# Patient Record
Sex: FEMALE | Race: WHITE | NOT HISPANIC OR LATINO | Employment: UNEMPLOYED | URBAN - METROPOLITAN AREA
[De-identification: names, ages, dates, MRNs, and addresses within clinical notes are randomized per-mention and may not be internally consistent; named-entity substitution may affect disease eponyms.]

---

## 2017-04-26 ENCOUNTER — TRANSCRIBE ORDERS (OUTPATIENT)
Dept: ADMINISTRATIVE | Facility: HOSPITAL | Age: 82
End: 2017-04-26

## 2017-04-26 DIAGNOSIS — Z78.0 MENOPAUSE: Primary | ICD-10-CM

## 2017-04-28 ENCOUNTER — APPOINTMENT (OUTPATIENT)
Dept: GENERAL RADIOLOGY | Facility: HOSPITAL | Age: 82
End: 2017-04-28

## 2017-04-28 ENCOUNTER — HOSPITAL ENCOUNTER (EMERGENCY)
Facility: HOSPITAL | Age: 82
Discharge: HOME | End: 2017-04-28
Attending: EMERGENCY MEDICINE | Admitting: EMERGENCY MEDICINE

## 2017-04-28 VITALS
SYSTOLIC BLOOD PRESSURE: 185 MMHG | RESPIRATION RATE: 18 BRPM | HEART RATE: 90 BPM | BODY MASS INDEX: 25.46 KG/M2 | WEIGHT: 168 LBS | DIASTOLIC BLOOD PRESSURE: 95 MMHG | OXYGEN SATURATION: 98 % | HEIGHT: 68 IN | TEMPERATURE: 98.1 F

## 2017-04-28 DIAGNOSIS — M17.11 ARTHRITIS OF KNEE, RIGHT: ICD-10-CM

## 2017-04-28 DIAGNOSIS — N39.0 URINARY TRACT INFECTION IN ELDERLY PATIENT: Primary | ICD-10-CM

## 2017-04-28 DIAGNOSIS — M16.0 ARTHRITIS OF BOTH HIPS: ICD-10-CM

## 2017-04-28 LAB
ALBUMIN SERPL-MCNC: 3.5 G/DL (ref 3.5–5.2)
ALBUMIN/GLOB SERPL: 1.2 G/DL
ALP SERPL-CCNC: 67 U/L (ref 40–129)
ALT SERPL W P-5'-P-CCNC: 10 U/L (ref 5–33)
ANION GAP SERPL CALCULATED.3IONS-SCNC: 16.4 MMOL/L
AST SERPL-CCNC: 18 U/L (ref 5–32)
BACTERIA UR QL AUTO: ABNORMAL /HPF
BASOPHILS # BLD AUTO: 0.03 10*3/MM3 (ref 0–0.2)
BASOPHILS NFR BLD AUTO: 0.5 % (ref 0–2)
BILIRUB SERPL-MCNC: 1.9 MG/DL (ref 0.2–1.2)
BILIRUB UR QL STRIP: NEGATIVE
BUN BLD-MCNC: 14 MG/DL (ref 8–23)
BUN/CREAT SERPL: 14.1 (ref 7–25)
CALCIUM SPEC-SCNC: 8.7 MG/DL (ref 8.8–10.5)
CHLORIDE SERPL-SCNC: 100 MMOL/L (ref 98–107)
CLARITY UR: CLEAR
CO2 SERPL-SCNC: 22.6 MMOL/L (ref 22–29)
COLOR UR: YELLOW
CREAT BLD-MCNC: 0.99 MG/DL (ref 0.57–1)
DEPRECATED RDW RBC AUTO: 43.3 FL (ref 37–54)
EOSINOPHIL # BLD AUTO: 0.11 10*3/MM3 (ref 0.1–0.3)
EOSINOPHIL NFR BLD AUTO: 1.7 % (ref 0–4)
ERYTHROCYTE [DISTWIDTH] IN BLOOD BY AUTOMATED COUNT: 13.3 % (ref 11.5–14.5)
GFR SERPL CREATININE-BSD FRML MDRD: 53 ML/MIN/1.73
GLOBULIN UR ELPH-MCNC: 3 GM/DL
GLUCOSE BLD-MCNC: 112 MG/DL (ref 65–99)
GLUCOSE UR STRIP-MCNC: NEGATIVE MG/DL
HCT VFR BLD AUTO: 40.9 % (ref 37–47)
HGB BLD-MCNC: 13.8 G/DL (ref 12–16)
HGB UR QL STRIP.AUTO: NEGATIVE
HOLD SPECIMEN: NORMAL
HOLD SPECIMEN: NORMAL
HYALINE CASTS UR QL AUTO: ABNORMAL /LPF
IMM GRANULOCYTES # BLD: 0.03 10*3/MM3 (ref 0–0.03)
IMM GRANULOCYTES NFR BLD: 0.5 % (ref 0–0.5)
KETONES UR QL STRIP: NEGATIVE
LEUKOCYTE ESTERASE UR QL STRIP.AUTO: NEGATIVE
LYMPHOCYTES # BLD AUTO: 1.07 10*3/MM3 (ref 0.6–4.8)
LYMPHOCYTES NFR BLD AUTO: 16.8 % (ref 20–45)
MAGNESIUM SERPL-MCNC: 1.8 MG/DL (ref 1.7–2.5)
MCH RBC QN AUTO: 29.9 PG (ref 27–31)
MCHC RBC AUTO-ENTMCNC: 33.7 G/DL (ref 31–37)
MCV RBC AUTO: 88.7 FL (ref 81–99)
MONOCYTES # BLD AUTO: 0.55 10*3/MM3 (ref 0–1)
MONOCYTES NFR BLD AUTO: 8.6 % (ref 3–8)
NEUTROPHILS # BLD AUTO: 4.57 10*3/MM3 (ref 1.5–8.3)
NEUTROPHILS NFR BLD AUTO: 71.9 % (ref 45–70)
NITRITE UR QL STRIP: POSITIVE
NRBC BLD MANUAL-RTO: 0 /100 WBC (ref 0–0)
PH UR STRIP.AUTO: 7 [PH] (ref 4.5–8)
PLATELET # BLD AUTO: 212 10*3/MM3 (ref 140–500)
PMV BLD AUTO: 9.7 FL (ref 7.4–10.4)
POTASSIUM BLD-SCNC: 4.2 MMOL/L (ref 3.5–5.2)
PROT SERPL-MCNC: 6.5 G/DL (ref 6–8.5)
PROT UR QL STRIP: NEGATIVE
RBC # BLD AUTO: 4.61 10*6/MM3 (ref 4.2–5.4)
RBC # UR: ABNORMAL /HPF
REF LAB TEST METHOD: ABNORMAL
SODIUM BLD-SCNC: 139 MMOL/L (ref 136–145)
SP GR UR STRIP: 1.01 (ref 1–1.03)
SQUAMOUS #/AREA URNS HPF: ABNORMAL /HPF
TROPONIN T SERPL-MCNC: <0.01 NG/ML (ref 0–0.03)
UROBILINOGEN UR QL STRIP: ABNORMAL
WBC NRBC COR # BLD: 6.36 10*3/MM3 (ref 4.8–10.8)
WBC UR QL AUTO: ABNORMAL /HPF
WHOLE BLOOD HOLD SPECIMEN: NORMAL
WHOLE BLOOD HOLD SPECIMEN: NORMAL

## 2017-04-28 PROCEDURE — 73502 X-RAY EXAM HIP UNI 2-3 VIEWS: CPT

## 2017-04-28 PROCEDURE — 85025 COMPLETE CBC W/AUTO DIFF WBC: CPT | Performed by: EMERGENCY MEDICINE

## 2017-04-28 PROCEDURE — 87077 CULTURE AEROBIC IDENTIFY: CPT | Performed by: EMERGENCY MEDICINE

## 2017-04-28 PROCEDURE — 73562 X-RAY EXAM OF KNEE 3: CPT

## 2017-04-28 PROCEDURE — 80053 COMPREHEN METABOLIC PANEL: CPT | Performed by: EMERGENCY MEDICINE

## 2017-04-28 PROCEDURE — 81001 URINALYSIS AUTO W/SCOPE: CPT | Performed by: EMERGENCY MEDICINE

## 2017-04-28 PROCEDURE — 87086 URINE CULTURE/COLONY COUNT: CPT | Performed by: EMERGENCY MEDICINE

## 2017-04-28 PROCEDURE — 99285 EMERGENCY DEPT VISIT HI MDM: CPT | Performed by: EMERGENCY MEDICINE

## 2017-04-28 PROCEDURE — 99284 EMERGENCY DEPT VISIT MOD MDM: CPT

## 2017-04-28 PROCEDURE — 87186 SC STD MICRODIL/AGAR DIL: CPT | Performed by: EMERGENCY MEDICINE

## 2017-04-28 PROCEDURE — 71010 HC CHEST PA OR AP: CPT

## 2017-04-28 PROCEDURE — 83735 ASSAY OF MAGNESIUM: CPT | Performed by: EMERGENCY MEDICINE

## 2017-04-28 PROCEDURE — 84484 ASSAY OF TROPONIN QUANT: CPT | Performed by: EMERGENCY MEDICINE

## 2017-04-28 RX ORDER — SODIUM CHLORIDE 0.9 % (FLUSH) 0.9 %
10 SYRINGE (ML) INJECTION AS NEEDED
Status: DISCONTINUED | OUTPATIENT
Start: 2017-04-28 | End: 2017-04-28 | Stop reason: HOSPADM

## 2017-04-28 RX ORDER — ATORVASTATIN CALCIUM 10 MG/1
10 TABLET, FILM COATED ORAL DAILY
COMMUNITY

## 2017-04-28 RX ORDER — TRAMADOL HYDROCHLORIDE 50 MG/1
50 TABLET ORAL EVERY 6 HOURS PRN
Qty: 24 TABLET | Refills: 0 | Status: ON HOLD | OUTPATIENT
Start: 2017-04-28 | End: 2017-06-11

## 2017-04-28 RX ORDER — OLANZAPINE 2.5 MG/1
2.5 TABLET ORAL NIGHTLY
COMMUNITY

## 2017-04-28 RX ORDER — SENNOSIDES 8.6 MG
650 CAPSULE ORAL 3 TIMES DAILY
Status: ON HOLD | COMMUNITY
End: 2017-06-11

## 2017-04-28 RX ORDER — GABAPENTIN 300 MG/1
300 CAPSULE ORAL DAILY
COMMUNITY

## 2017-04-28 RX ORDER — CHOLECALCIFEROL (VITAMIN D3) 125 MCG
2 CAPSULE ORAL DAILY
COMMUNITY

## 2017-04-28 RX ORDER — AMLODIPINE BESYLATE 2.5 MG/1
2.5 TABLET ORAL DAILY
COMMUNITY

## 2017-04-28 RX ORDER — CEFUROXIME AXETIL 250 MG/1
500 TABLET ORAL ONCE
Status: COMPLETED | OUTPATIENT
Start: 2017-04-28 | End: 2017-04-28

## 2017-04-28 RX ORDER — CEFUROXIME AXETIL 500 MG/1
500 TABLET ORAL 2 TIMES DAILY
Qty: 20 TABLET | Refills: 0 | Status: SHIPPED | OUTPATIENT
Start: 2017-04-28 | End: 2017-05-08

## 2017-04-28 RX ORDER — ACETAMINOPHEN 500 MG
500 TABLET ORAL DAILY
Status: ON HOLD | COMMUNITY
End: 2017-06-11

## 2017-04-28 RX ORDER — LANSOPRAZOLE 30 MG/1
30 CAPSULE, DELAYED RELEASE ORAL DAILY
COMMUNITY

## 2017-04-28 RX ADMIN — CEFUROXIME AXETIL 500 MG: 250 TABLET ORAL at 14:26

## 2017-04-30 LAB — BACTERIA SPEC AEROBE CULT: ABNORMAL

## 2017-05-10 ENCOUNTER — APPOINTMENT (OUTPATIENT)
Dept: BONE DENSITY | Facility: HOSPITAL | Age: 82
End: 2017-05-10

## 2017-06-11 ENCOUNTER — HOSPITAL ENCOUNTER (OUTPATIENT)
Facility: HOSPITAL | Age: 82
Setting detail: OBSERVATION
Discharge: HOME | End: 2017-06-14
Attending: EMERGENCY MEDICINE | Admitting: HOSPITALIST

## 2017-06-11 ENCOUNTER — APPOINTMENT (OUTPATIENT)
Dept: CT IMAGING | Facility: HOSPITAL | Age: 82
End: 2017-06-11

## 2017-06-11 ENCOUNTER — APPOINTMENT (OUTPATIENT)
Dept: GENERAL RADIOLOGY | Facility: HOSPITAL | Age: 82
End: 2017-06-11

## 2017-06-11 DIAGNOSIS — R41.82 ALTERED MENTAL STATUS, UNSPECIFIED: ICD-10-CM

## 2017-06-11 DIAGNOSIS — R53.1 WEAKNESS: ICD-10-CM

## 2017-06-11 DIAGNOSIS — N39.0 ACUTE UTI: Primary | ICD-10-CM

## 2017-06-11 DIAGNOSIS — N28.9 RENAL INSUFFICIENCY: ICD-10-CM

## 2017-06-11 DIAGNOSIS — R53.1 GENERALIZED WEAKNESS: ICD-10-CM

## 2017-06-11 LAB
ALBUMIN SERPL-MCNC: 3.4 G/DL (ref 3.5–5.2)
ALBUMIN/GLOB SERPL: 1.1 G/DL
ALP SERPL-CCNC: 170 U/L (ref 40–129)
ALT SERPL W P-5'-P-CCNC: 8 U/L (ref 5–33)
ANION GAP SERPL CALCULATED.3IONS-SCNC: 18.7 MMOL/L
AST SERPL-CCNC: 17 U/L (ref 5–32)
BACTERIA UR QL AUTO: ABNORMAL /HPF
BASOPHILS # BLD AUTO: 0.02 10*3/MM3 (ref 0–0.2)
BASOPHILS NFR BLD AUTO: 0.3 % (ref 0–2)
BILIRUB SERPL-MCNC: 1.7 MG/DL (ref 0.2–1.2)
BILIRUB UR QL STRIP: NEGATIVE
BUN BLD-MCNC: 18 MG/DL (ref 8–23)
BUN/CREAT SERPL: 15.8 (ref 7–25)
CALCIUM SPEC-SCNC: 8.9 MG/DL (ref 8.8–10.5)
CHLORIDE SERPL-SCNC: 99 MMOL/L (ref 98–107)
CK SERPL-CCNC: 100 U/L (ref 26–142)
CLARITY UR: ABNORMAL
CO2 SERPL-SCNC: 21.3 MMOL/L (ref 22–29)
COLOR UR: YELLOW
CREAT BLD-MCNC: 1.14 MG/DL (ref 0.57–1)
DEPRECATED RDW RBC AUTO: 43.1 FL (ref 37–54)
EOSINOPHIL # BLD AUTO: 0.07 10*3/MM3 (ref 0.1–0.3)
EOSINOPHIL NFR BLD AUTO: 1.2 % (ref 0–4)
ERYTHROCYTE [DISTWIDTH] IN BLOOD BY AUTOMATED COUNT: 13.4 % (ref 11.5–14.5)
GFR SERPL CREATININE-BSD FRML MDRD: 45 ML/MIN/1.73
GLOBULIN UR ELPH-MCNC: 3 GM/DL
GLUCOSE BLD-MCNC: 117 MG/DL (ref 65–99)
GLUCOSE UR STRIP-MCNC: NEGATIVE MG/DL
HCT VFR BLD AUTO: 40.5 % (ref 37–47)
HGB BLD-MCNC: 13.8 G/DL (ref 12–16)
HGB UR QL STRIP.AUTO: ABNORMAL
HYALINE CASTS UR QL AUTO: ABNORMAL /LPF
IMM GRANULOCYTES # BLD: 0.02 10*3/MM3 (ref 0–0.03)
IMM GRANULOCYTES NFR BLD: 0.3 % (ref 0–0.5)
KETONES UR QL STRIP: NEGATIVE
LEUKOCYTE ESTERASE UR QL STRIP.AUTO: ABNORMAL
LYMPHOCYTES # BLD AUTO: 0.79 10*3/MM3 (ref 0.6–4.8)
LYMPHOCYTES NFR BLD AUTO: 13.2 % (ref 20–45)
MCH RBC QN AUTO: 30 PG (ref 27–31)
MCHC RBC AUTO-ENTMCNC: 34.1 G/DL (ref 31–37)
MCV RBC AUTO: 88 FL (ref 81–99)
MONOCYTES # BLD AUTO: 0.51 10*3/MM3 (ref 0–1)
MONOCYTES NFR BLD AUTO: 8.5 % (ref 3–8)
NEUTROPHILS # BLD AUTO: 4.59 10*3/MM3 (ref 1.5–8.3)
NEUTROPHILS NFR BLD AUTO: 76.5 % (ref 45–70)
NITRITE UR QL STRIP: NEGATIVE
NRBC BLD MANUAL-RTO: 0 /100 WBC (ref 0–0)
PH UR STRIP.AUTO: 7.5 [PH] (ref 4.5–8)
PLATELET # BLD AUTO: 246 10*3/MM3 (ref 140–500)
PMV BLD AUTO: 9.4 FL (ref 7.4–10.4)
POTASSIUM BLD-SCNC: 3.5 MMOL/L (ref 3.5–5.2)
PROT SERPL-MCNC: 6.4 G/DL (ref 6–8.5)
PROT UR QL STRIP: NEGATIVE
RBC # BLD AUTO: 4.6 10*6/MM3 (ref 4.2–5.4)
RBC # UR: ABNORMAL /HPF
REF LAB TEST METHOD: ABNORMAL
SODIUM BLD-SCNC: 139 MMOL/L (ref 136–145)
SP GR UR STRIP: 1.02 (ref 1–1.03)
SQUAMOUS #/AREA URNS HPF: ABNORMAL /HPF
TROPONIN T SERPL-MCNC: <0.01 NG/ML (ref 0–0.03)
UROBILINOGEN UR QL STRIP: ABNORMAL
WBC NRBC COR # BLD: 6 10*3/MM3 (ref 4.8–10.8)
WBC UR QL AUTO: ABNORMAL /HPF

## 2017-06-11 PROCEDURE — 85025 COMPLETE CBC W/AUTO DIFF WBC: CPT | Performed by: EMERGENCY MEDICINE

## 2017-06-11 PROCEDURE — 84484 ASSAY OF TROPONIN QUANT: CPT | Performed by: EMERGENCY MEDICINE

## 2017-06-11 PROCEDURE — 99284 EMERGENCY DEPT VISIT MOD MDM: CPT | Performed by: EMERGENCY MEDICINE

## 2017-06-11 PROCEDURE — G0378 HOSPITAL OBSERVATION PER HR: HCPCS

## 2017-06-11 PROCEDURE — 93010 ELECTROCARDIOGRAM REPORT: CPT | Performed by: INTERNAL MEDICINE

## 2017-06-11 PROCEDURE — 81001 URINALYSIS AUTO W/SCOPE: CPT | Performed by: EMERGENCY MEDICINE

## 2017-06-11 PROCEDURE — 80053 COMPREHEN METABOLIC PANEL: CPT | Performed by: EMERGENCY MEDICINE

## 2017-06-11 PROCEDURE — 99284 EMERGENCY DEPT VISIT MOD MDM: CPT

## 2017-06-11 PROCEDURE — 87076 CULTURE ANAEROBE IDENT EACH: CPT | Performed by: HOSPITALIST

## 2017-06-11 PROCEDURE — 71010 HC CHEST PA OR AP: CPT

## 2017-06-11 PROCEDURE — 93005 ELECTROCARDIOGRAM TRACING: CPT | Performed by: EMERGENCY MEDICINE

## 2017-06-11 PROCEDURE — 82550 ASSAY OF CK (CPK): CPT | Performed by: EMERGENCY MEDICINE

## 2017-06-11 PROCEDURE — 87086 URINE CULTURE/COLONY COUNT: CPT | Performed by: HOSPITALIST

## 2017-06-11 PROCEDURE — 70450 CT HEAD/BRAIN W/O DYE: CPT

## 2017-06-11 RX ORDER — CEFUROXIME AXETIL 250 MG/1
250 TABLET ORAL EVERY 12 HOURS SCHEDULED
Status: DISCONTINUED | OUTPATIENT
Start: 2017-06-12 | End: 2017-06-14 | Stop reason: HOSPADM

## 2017-06-11 RX ORDER — AMLODIPINE BESYLATE 2.5 MG/1
2.5 TABLET ORAL DAILY
Status: DISCONTINUED | OUTPATIENT
Start: 2017-06-11 | End: 2017-06-14 | Stop reason: HOSPADM

## 2017-06-11 RX ORDER — SPIRONOLACTONE 25 MG/1
25 TABLET ORAL DAILY PRN
COMMUNITY

## 2017-06-11 RX ORDER — PANTOPRAZOLE SODIUM 40 MG/1
40 TABLET, DELAYED RELEASE ORAL EVERY MORNING
Status: DISCONTINUED | OUTPATIENT
Start: 2017-06-12 | End: 2017-06-14 | Stop reason: HOSPADM

## 2017-06-11 RX ORDER — CEFUROXIME AXETIL 250 MG/1
250 TABLET ORAL EVERY 12 HOURS SCHEDULED
Status: DISCONTINUED | OUTPATIENT
Start: 2017-06-11 | End: 2017-06-11

## 2017-06-11 RX ORDER — CEFUROXIME AXETIL 250 MG/1
250 TABLET ORAL ONCE
Status: COMPLETED | OUTPATIENT
Start: 2017-06-11 | End: 2017-06-11

## 2017-06-11 RX ORDER — SODIUM CHLORIDE 0.9 % (FLUSH) 0.9 %
1-10 SYRINGE (ML) INJECTION AS NEEDED
Status: DISCONTINUED | OUTPATIENT
Start: 2017-06-11 | End: 2017-06-14 | Stop reason: HOSPADM

## 2017-06-11 RX ORDER — GABAPENTIN 300 MG/1
300 CAPSULE ORAL DAILY
Status: DISCONTINUED | OUTPATIENT
Start: 2017-06-11 | End: 2017-06-14 | Stop reason: HOSPADM

## 2017-06-11 RX ORDER — OLANZAPINE 2.5 MG/1
2.5 TABLET ORAL NIGHTLY
Status: DISCONTINUED | OUTPATIENT
Start: 2017-06-11 | End: 2017-06-14 | Stop reason: HOSPADM

## 2017-06-11 RX ORDER — MELATONIN
4000 DAILY
Status: DISCONTINUED | OUTPATIENT
Start: 2017-06-11 | End: 2017-06-14 | Stop reason: HOSPADM

## 2017-06-11 RX ORDER — ACETAMINOPHEN 325 MG/1
650 TABLET ORAL EVERY 4 HOURS PRN
Status: DISCONTINUED | OUTPATIENT
Start: 2017-06-11 | End: 2017-06-14 | Stop reason: HOSPADM

## 2017-06-11 RX ORDER — ACETAMINOPHEN AND CODEINE PHOSPHATE 300; 30 MG/1; MG/1
1 TABLET ORAL EVERY 6 HOURS PRN
COMMUNITY

## 2017-06-11 RX ADMIN — CEFUROXIME AXETIL 250 MG: 250 TABLET ORAL at 15:24

## 2017-06-11 RX ADMIN — OLANZAPINE 2.5 MG: 2.5 TABLET, FILM COATED ORAL at 21:10

## 2017-06-11 NOTE — ED PROVIDER NOTES
Subjective   History of Present Illness  History of Present Illness    Chief complaint: Altered mental status    Location: Home    Quality/Severity:  Moderate    Timing/Onset/Duration: gradual onset over the last 2 days    Modifying Factors: Nothing seems to make it better or worse    Associated Symptoms: No headache.  No fever chills or cough.  No sore throat earache or nasal congestion.  No chest pain.  No shortness of breath.  No abdominal pain.  No diarrhea or burning when she urinates.  No numbness, or tingling.  No change in bladder or bowel function.  The patient has had weakness with difficulty walking.    Narrative: This 87-year-old white female has been brought to the emergency department with increased confusion.  The patient has a history of dementia.  Over the last 2 days her confusion has gotten worse.  The patient was seen by her primary care provider last week for weakness with difficulty walking.  She had her Lipitor discontinued.  Family states that this has made no impact on her ability to walk.  The patient has not had any headache.  No fever or chills.  There has been no cough.  No sore throat earache or nasal congestion.  No chest pain or shortness of breath.  No abdominal pain.  No diarrhea or burning when she urinates.  The patient states that she has fallen but the family has not witnessed a fall.  There's been no other change in medications or dosage changes.    PCP:  Nico De Souza      Review of Systems   Constitutional: Negative for chills and fever.   HENT: Negative for congestion, ear pain and sore throat.    Eyes: Positive for visual disturbance. Negative for pain and discharge.   Respiratory: Negative for cough, chest tightness and shortness of breath.    Cardiovascular: Negative for chest pain, palpitations and leg swelling.   Gastrointestinal: Negative for abdominal pain, blood in stool, constipation, diarrhea, nausea and vomiting.   Genitourinary: Negative for decreased urine volume,  dysuria, flank pain, frequency, hematuria, pelvic pain and urgency.   Musculoskeletal: Negative for back pain and neck pain.   Skin: Positive for wound (there is a bruise on the right arm that the  states is due to to picking her up.  There are other scattered bruises that appear to be old.).   Neurological: Positive for weakness (generalized). Negative for numbness and headaches.   Hematological: Negative for adenopathy.   Psychiatric/Behavioral: Positive for confusion.        Medication List      ASK your doctor about these medications          acetaminophen-codeine 300-30 MG per tablet   Commonly known as:  TYLENOL #3       amLODIPine 2.5 MG tablet   Commonly known as:  NORVASC       atorvastatin 10 MG tablet   Commonly known as:  LIPITOR       gabapentin 300 MG capsule   Commonly known as:  NEURONTIN       OLANZapine 2.5 MG tablet   Commonly known as:  zyPREXA       PREVACID 30 MG capsule   Generic drug:  lansoprazole       spironolactone 25 MG tablet   Commonly known as:  ALDACTONE       Vitamin D3 2000 UNITS tablet           Past Medical History:   Diagnosis Date   • Alzheimer's dementia    • Arthritis    • Dementia    • GERD (gastroesophageal reflux disease)    • Hyperlipidemia    • Hypertension    • Sciatic nerve pain        No Known Allergies    Past Surgical History:   Procedure Laterality Date   • BLADDER SURGERY     • HYSTERECTOMY     • JOINT REPLACEMENT      bilateral knees       History reviewed. No pertinent family history.    Social History     Social History   • Marital status:      Spouse name: N/A   • Number of children: N/A   • Years of education: N/A     Social History Main Topics   • Smoking status: Former Smoker   • Smokeless tobacco: Never Used      Comment: 1968 stopped smoking   • Alcohol use No   • Drug use: No   • Sexual activity: Not Asked     Other Topics Concern   • None     Social History Narrative           Objective   Physical Exam   Constitutional: She appears  well-developed and well-nourished. No distress.   ED Triage Vitals:  Temp: 98.6 °F (37 °C) (06/11/17 1149)  Heart Rate: 81 (06/11/17 1149)  Resp: 18 (06/11/17 1149)  BP: 185/101 (06/11/17 1149)  SpO2: 98 % (06/11/17 1149)  Temp src: Oral (06/11/17 1149)  Heart Rate Source: Monitor (06/11/17 1149)  Patient Position: Lying (06/11/17 1149)  BP Location: Right arm (06/11/17 1149)  FiO2 (%): n/a    The patient's vitals were reviewed by me.  Unless otherwise noted they are within normal limits.  The patient is hypertensive with a blood pressure 185/101.     HENT:   Head: Normocephalic and atraumatic.   Right Ear: External ear normal.   Left Ear: External ear normal.   Nose: Nose normal.   Mouth/Throat: Oropharynx is clear and moist.   Eyes: Conjunctivae and EOM are normal. Pupils are equal, round, and reactive to light. Right eye exhibits no discharge. Left eye exhibits no discharge.   Neck: Normal range of motion. Neck supple. No JVD present. No tracheal deviation present. No thyromegaly present.   There is no tenderness, deformity, or bony step-offs upon palpation the cervical, thoracic, lumbar sacrococcygeal spine.   Cardiovascular: Normal rate, regular rhythm and intact distal pulses.  Exam reveals no gallop and no friction rub.    Murmur (2 over 6 systolic ejection murmur heard loudest at the aortic listening post.) heard.  Pulmonary/Chest: Effort normal and breath sounds normal. No stridor. No respiratory distress. She has no wheezes. She has no rales. She exhibits no tenderness.   Abdominal: Soft. Bowel sounds are normal. She exhibits no distension and no mass. There is no tenderness. There is no rebound and no guarding. No hernia.   Musculoskeletal: Normal range of motion. She exhibits no edema or deformity.   There is bruising noted to the right arm.  The capillary refill is less than 2 seconds.  The sensation is intact.  There is a normal range of motion noted.  There is no joint laxity noted.  Is a 2+ radial  and ulnar pulse.  Is no tenderness upon palpation the right arm.    Extremities are otherwise without tenderness or deformity and are neurovascularly intact.   Lymphadenopathy:     She has no cervical adenopathy.   Neurological: She is alert. No cranial nerve deficit. She exhibits normal muscle tone. Coordination normal.   The patient knows her name, she does not know the year, she does not know were she has, she recognizes her .   Skin: Skin is warm and dry. No rash noted. She is not diaphoretic. No erythema. No pallor.   Psychiatric: Her behavior is normal.   Nursing note and vitals reviewed.      Procedures         ED Course  ED Course   Comment By Time   The laboratory values were reviewed by me.  The urine microscopic shows 0-2 RBCs, 3-5 day BCs, trace bacteria, 3-6 squamous epithelial cells.  Leukocyte esterase is trace.  The neutrophil percent is 76%.  The glucose is 117.  The creatinines 1.14.  The CO2 is 21.  The albumin is 3.4.  The alkaline phosphatase is 170.  Total bilirubin is 1.7.  The GFR is 45.  These are otherwise unremarkable. Nico Ceja MD 06/11 5316   On April 28, 2017 the patient had a BUN of 14 and a creatinine of 0.99.  Bilirubin was 1.9.  The GFR was 53.  The hemoglobin was 13 and hematocrit 40.  The white blood cell count 6.36.  Platelet count 212,000.    The patient has worsening renal function today. Nico Ceja MD 06/11 1434    8:07 AM, 06/13/17:  The EKG was obtained at 1202.  EKG was read by me at 1203.  The EKG shows a normal sinus rhythm with rate of 92.  The LA, and QT intervals are unremarkable.  There is an incomplete right bundle branch block.  There is no ectopy.  There is T-wave inversion in 3 and aVF.  There is also T-wave inversion in V1 through the 4 with T wave flattening in V5 and V6.  There is no acute ST elevation.The EKG today was compared to an EKG dated July 21, 2012.  Today there is T-wave inversion in aVF and in V3 through V4 with increased T  wave flattening in V5 and V6.    8:07 AM, 06/13/17:  The patient was reassessed.  Her vital signs are stable.  Neurolotgical exam: Unchanged.  The patient was unable to bear weight.    8:07 AM, 06/13/17:  I spoke with Dr. Montalvo, on-call for the hospitalist, she will admit the patient.  We will give the patient Ceftin 250 mg by mouth here in the emergency department.  We will culture her urine.    8:07 AM, 06/13/17:  The patient's diagnosis was discussed with her and her family.  All their questions were and the patient will be admitted in stable condition.        MDM  XR Hip With or Without Pelvis 1 View Left   Final Result   No acute osseous abnormality. Mild degenerative arthropathy.       This report was finalized on 6/12/2017 3:24 PM by Dr. Eric Pruett MD.          XR Chest 1 View   ED Interpretation   The chest x-ray as contemporaneously reviewed by me shows no   active disease.      Final Result   1. No definite active disease.   2. Shallow lung expansion with mild bibasilar atelectasis.   3. No significant change since 04/28/2017.       This report was finalized on 6/12/2017 7:10 AM by Dr. Eric Pruett MD.          CT Head Without Contrast   ED Interpretation   The CT the head as read by Dr. Baumann shows atrophy, chronic   periventricular ischemic changes, sphenoid sinusitis.      Final Result   1. No acute intracranial abnormality.   2. Stable diffuse chronic changes as noted above.   3. Sphenoid sinus disease.   4. No change since 07/21/2012.   5. Preliminary stat report to the ED from Dr. Juma Babcock at 1338 hours   on 06/11/2017.       This report was finalized on 6/12/2017 7:09 AM by Dr. Eric Pruett MD.          XR Hip With or Without Pelvis 1 View Right    (Results Pending)   XR Knee 1 or 2 View Right    (Results Pending)     Labs Reviewed   URINALYSIS W/ CULTURE IF INDICATED - Abnormal; Notable for the following:        Result Value    Appearance, UA Slightly Cloudy (*)      Blood, UA Trace (*)     Leuk Esterase, UA Trace (*)     All other components within normal limits   COMPREHENSIVE METABOLIC PANEL - Abnormal; Notable for the following:     Glucose 117 (*)     Creatinine 1.14 (*)     CO2 21.3 (*)     Albumin 3.40 (*)     Alkaline Phosphatase 170 (*)     Total Bilirubin 1.7 (*)     eGFR Non  Amer 45 (*)     All other components within normal limits    Narrative:     The MDRD GFR formula is only valid for adults with stable renal function between ages 18 and 70.   CBC WITH AUTO DIFFERENTIAL - Abnormal; Notable for the following:     Neutrophil % 76.5 (*)     Lymphocyte % 13.2 (*)     Monocyte % 8.5 (*)     Eosinophils, Absolute 0.07 (*)     All other components within normal limits   URINALYSIS, MICROSCOPIC ONLY - Abnormal; Notable for the following:     RBC, UA 0-2 (*)     WBC, UA 3-5 (*)     Bacteria, UA Trace (*)     Squamous Epithelial Cells, UA 3-6 (*)     All other components within normal limits   BASIC METABOLIC PANEL - Abnormal; Notable for the following:     Glucose 136 (*)     Calcium 8.5 (*)     eGFR Non  Amer 60 (*)     All other components within normal limits    Narrative:     The MDRD GFR formula is only valid for adults with stable renal function between ages 18 and 70.   URINE CULTURE - Normal   TROPONIN (IN-HOUSE) - Normal    Narrative:     Troponin T Reference Ranges:  Less than 0.03 ng/mL:    Negative for AMI  0.03 to 0.09 ng/mL:      Indeterminant for AMI  Greater than 0.09 ng/mL: Positive for AMI   CK - Normal   CBC (NO DIFF) - Normal   CBC AND DIFFERENTIAL    Narrative:     The following orders were created for panel order CBC & Differential.  Procedure                               Abnormality         Status                     ---------                               -----------         ------                     CBC Auto Differential[[id number]]         Abnormal            Final result                 Please view results for these tests on the  individual orders.         Final diagnoses:   Acute UTI   Altered mental status, unspecified   Weakness   Renal insufficiency         ED Medications:  Medications   amLODIPine (NORVASC) tablet 2.5 mg ( Oral Canceled Entry 6/12/17 0900)   cholecalciferol (VITAMIN D3) tablet 4,000 Units ( Oral Canceled Entry 6/12/17 0900)   gabapentin (NEURONTIN) capsule 300 mg ( Oral Canceled Entry 6/12/17 0900)   pantoprazole (PROTONIX) EC tablet 40 mg (40 mg Oral Given 6/12/17 0623)   OLANZapine (zyPREXA) tablet 2.5 mg (2.5 mg Oral Not Given 6/12/17 2024)   sodium chloride 0.9 % flush 1-10 mL (not administered)   enoxaparin (LOVENOX) syringe 30 mg ( Subcutaneous Canceled Entry 6/12/17 0900)   acetaminophen (TYLENOL) tablet 650 mg (650 mg Oral Given 6/12/17 1246)   cefuroxime (CEFTIN) tablet 250 mg (250 mg Oral Not Given 6/12/17 2024)   cefuroxime (CEFTIN) tablet 250 mg (250 mg Oral Given 6/11/17 1524)       New Medications:     Medication List      ASK your doctor about these medications          acetaminophen-codeine 300-30 MG per tablet   Commonly known as:  TYLENOL #3       amLODIPine 2.5 MG tablet   Commonly known as:  NORVASC       atorvastatin 10 MG tablet   Commonly known as:  LIPITOR       gabapentin 300 MG capsule   Commonly known as:  NEURONTIN       OLANZapine 2.5 MG tablet   Commonly known as:  zyPREXA       PREVACID 30 MG capsule   Generic drug:  lansoprazole       spironolactone 25 MG tablet   Commonly known as:  ALDACTONE       Vitamin D3 2000 UNITS tablet           Stopped Medications:     Medication List      ASK your doctor about these medications          acetaminophen-codeine 300-30 MG per tablet   Commonly known as:  TYLENOL #3       amLODIPine 2.5 MG tablet   Commonly known as:  NORVASC       atorvastatin 10 MG tablet   Commonly known as:  LIPITOR       gabapentin 300 MG capsule   Commonly known as:  NEURONTIN       OLANZapine 2.5 MG tablet   Commonly known as:  zyPREXA       PREVACID 30 MG capsule   Generic  drug:  lansoprazole       spironolactone 25 MG tablet   Commonly known as:  ALDACTONE       Vitamin D3 2000 UNITS tablet             Final diagnoses:   Acute UTI   Altered mental status, unspecified   Weakness   Renal insufficiency            Nico Ceja MD  06/11/17 1435       Nico Ceja MD  06/11/17 1513       Nico Ceja MD  06/11/17 1550       Nico Ceja MD  06/13/17 0808

## 2017-06-11 NOTE — ED NOTES
Attempted to ambulate patient. Patient unable to stand with assistance.      Evan Byers  06/11/17 9177

## 2017-06-12 ENCOUNTER — APPOINTMENT (OUTPATIENT)
Dept: GENERAL RADIOLOGY | Facility: HOSPITAL | Age: 82
End: 2017-06-12

## 2017-06-12 PROBLEM — R53.1 GENERALIZED WEAKNESS: Status: ACTIVE | Noted: 2017-06-12

## 2017-06-12 PROCEDURE — 25010000002 ENOXAPARIN PER 10 MG: Performed by: HOSPITALIST

## 2017-06-12 PROCEDURE — G8979 MOBILITY GOAL STATUS: HCPCS

## 2017-06-12 PROCEDURE — 96372 THER/PROPH/DIAG INJ SC/IM: CPT

## 2017-06-12 PROCEDURE — G8987 SELF CARE CURRENT STATUS: HCPCS

## 2017-06-12 PROCEDURE — 73501 X-RAY EXAM HIP UNI 1 VIEW: CPT

## 2017-06-12 PROCEDURE — G0378 HOSPITAL OBSERVATION PER HR: HCPCS

## 2017-06-12 PROCEDURE — G8978 MOBILITY CURRENT STATUS: HCPCS

## 2017-06-12 PROCEDURE — 97161 PT EVAL LOW COMPLEX 20 MIN: CPT

## 2017-06-12 PROCEDURE — G8988 SELF CARE GOAL STATUS: HCPCS

## 2017-06-12 PROCEDURE — 97166 OT EVAL MOD COMPLEX 45 MIN: CPT

## 2017-06-12 RX ADMIN — ENOXAPARIN SODIUM 30 MG: 30 INJECTION SUBCUTANEOUS at 07:45

## 2017-06-12 RX ADMIN — ACETAMINOPHEN 650 MG: 325 TABLET, FILM COATED ORAL at 08:48

## 2017-06-12 RX ADMIN — GABAPENTIN 300 MG: 300 CAPSULE ORAL at 07:49

## 2017-06-12 RX ADMIN — PANTOPRAZOLE SODIUM 40 MG: 40 TABLET, DELAYED RELEASE ORAL at 06:23

## 2017-06-12 RX ADMIN — CEFUROXIME AXETIL 250 MG: 250 TABLET ORAL at 02:55

## 2017-06-12 RX ADMIN — VITAMIN D, TAB 1000IU (100/BT) 4000 UNITS: 25 TAB at 07:48

## 2017-06-12 RX ADMIN — AMLODIPINE BESYLATE 2.5 MG: 2.5 TABLET ORAL at 07:49

## 2017-06-12 RX ADMIN — OLANZAPINE 2.5 MG: 2.5 TABLET, FILM COATED ORAL at 19:51

## 2017-06-12 RX ADMIN — ACETAMINOPHEN 650 MG: 325 TABLET, FILM COATED ORAL at 12:46

## 2017-06-12 RX ADMIN — CEFUROXIME AXETIL 250 MG: 250 TABLET ORAL at 19:52

## 2017-06-12 NOTE — PLAN OF CARE
Problem: Inpatient Physical Therapy  Goal: Bed Mobility Goal STG- PT    06/12/17 1025   Bed Mobility PT STG   Bed Mobility PT STG, Date Established 06/12/17   Bed Mobility PT STG, Time to Achieve 3 days   Bed Mobility PT STG, Activity Type supine to sit/sit to supine   Bed Mobility PT STG, [place] Level supervision required;verbal cues required       Goal: Transfer Training Goal 1 STG- PT    06/12/17 1025   Transfer Training PT STG   Transfer Training PT STG, Date Established 06/12/17   Transfer Training PT STG, Time to Achieve 3 days   Transfer Training PT STG, Activity Type sit to stand/stand to sit   Transfer Training PT STG, [place] Level supervision required;verbal cues required       Goal: Gait Training Goal STG- PT    06/12/17 1025   Gait Training PT STG   Gait Training Goal PT STG, Date Established 06/12/17   Gait Training Goal PT STG, Time to Achieve 3 days   Gait Training Goal PT STG, [place] Level contact guard assist;verbal cues required   Gait Training Goal PT STG, Assist Device walker, rolling   Gait Training Goal PT STG, Distance to Achieve 50

## 2017-06-12 NOTE — PLAN OF CARE
Problem: Patient Care Overview (Adult)  Goal: Plan of Care Review  Outcome: Ongoing (interventions implemented as appropriate)  Goal: Adult Individualization and Mutuality  Outcome: Ongoing (interventions implemented as appropriate)  Goal: Discharge Needs Assessment  Outcome: Ongoing (interventions implemented as appropriate)    Problem: Fall Risk (Adult)  Goal: Identify Related Risk Factors and Signs and Symptoms  Outcome: Ongoing (interventions implemented as appropriate)  Goal: Absence of Falls  Outcome: Ongoing (interventions implemented as appropriate)    Problem: Pain, Acute (Adult)  Goal: Identify Related Risk Factors and Signs and Symptoms  Outcome: Ongoing (interventions implemented as appropriate)  Goal: Acceptable Pain Control/Comfort Level  Outcome: Ongoing (interventions implemented as appropriate)

## 2017-06-12 NOTE — NURSING NOTE
"Continued Stay Note  [facility]     Patient Name: Fernanda Spivey  MRN: [id number]  Today's Date: 6/12/2017    Admit Date: 6/11/2017          Discharge Plan       06/12/17 1059    Case Management/Social Work Plan    Additional Comments received call from nursing and MD regarding dc plans. 2nd visit with  at bedside. 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 when asked why patient was brought to hospital and he states \"I brought her here because of a bladder infection\". when asked if he was wanting nursing home placement for patient he states \"absolutely not\". he has a daily routine starting with waking her up, toileting and fixing breakfast, he puts her on the couch while he is out, comes back to fix lunch, toilets patient, he goes back out and comes back to fix supper. he states they are still able to sleep in the same bed. he states \"I know her better than anyone here\". he states patient does not ambulate much anymore and does not have a wondering behavior. he says his son works at [employer] training other pilots. his grandson is on vacation to [place] for a week and will be back & able to assist. 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 multiple pamphlets for private duty given but  refused to take them.  he was open to HH if that was ordered. His plan for patient is to return home when medically stable. will continue to follow.       06/12/17 1247    Case Management/Social Work Plan    Plan plan is home when medically stable.     Patient/Family In Agreement With Plan yes    Additional Comments spoke with  at bedside. he states he lives with wife in a one level home on [address] acres. no HH or home O2. recently got a w/c from [facility]. she has a rolling walker and BSC. uses [facility] in [place] and denies having trouble paying for medications.  provides transportation, " housekeeping and cooking. he assist with ADL's as needed. he states his son is involved some and grandson has been helping too. they do not have a living will and he is not interested in any information. he did bring in POA paperwork and copy was made. plan is home when medically stable.  provided HH list, [facility] and business card. signed MOON and denied copy. will continue to follow.               Discharge Codes     None            Sofía Charles RN

## 2017-06-12 NOTE — PLAN OF CARE
"Problem: Patient Care Overview (Adult)  Goal: Plan of Care Review    06/12/17 1018   Coping/Psychosocial Response Interventions   Plan Of Care Reviewed With patient   Outcome Evaluation   Outcome Summary/Follow up Plan PT Evaluation Complete: Patient completes supine to/from sit transfer with CGA/Min A and sit to/from stand transfer with CGA/Min A. Patient refuses mobility training today, states \"I don't think I should today.\" Patient oriented to name only, unable to obtain any prior level of function or history. Family not present. Patient would benefit from skilled PT services to address functional mobility deficts and maximize functional potential. Due to cognitive status recommend 24 hour care or LTC placement if family unable to provide care.            "

## 2017-06-12 NOTE — PLAN OF CARE
Problem: Patient Care Overview (Adult)  Goal: Discharge Needs Assessment  Outcome: Ongoing (interventions implemented as appropriate)    06/12/17 4635   Discharge Needs Assessment   Concerns To Be Addressed discharge planning concerns   Readmission Within The Last 30 Days no previous admission in last 30 days   Equipment Needed After Discharge (will continue to assess)   Discharge Planning Comments spoke with  at bedside. he states he lives with wife in a one level home on [address] acres. no HH or home O2. recently got a w/c from [facility]. she has a rolling walker and BSC. uses [facility] in [place] and denies having trouble paying for medications.  provides transportation, housekeeping and cooking. he assist with ADL's as needed. he states his son is involved some and grandson has been helping too. they do not have a living will and he is not interested in any information. he did bring in POA paperwork and copy was made. plan is home when medically stable.  provided HH list, [facility] [place] [facility] and business card. signed MOON and denied copy. will continue to follow.    Current Health   Anticipated Changes Related to Illness (will continue to assess)   Living Environment   Transportation Available family or friend will provide   Self-Care   Equipment Currently Used at Home walker, rolling;commode;wheelchair

## 2017-06-12 NOTE — THERAPY EVALUATION
Acute Care - Occupational Therapy Initial Evaluation  [place]     Patient Name: Fernanda Spivey  : 1929  MRN: [id number]  Today's Date: 2017  Onset of Illness/Injury or Date of Surgery Date: 17  Date of Referral to OT: 17  Referring Physician: Mario    Admit Date: 2017       ICD-10-CM ICD-9-CM   1. Acute UTI N39.0 599.0   2. Altered mental status, unspecified R41.82 780.97   3. Weakness R53.1 780.79   4. Renal insufficiency N28.9 593.9     Patient Active Problem List   Diagnosis   • Acute UTI   • Generalized weakness     Past Medical History:   Diagnosis Date   • Alzheimer's dementia    • Arthritis    • Dementia    • GERD (gastroesophageal reflux disease)    • Hyperlipidemia    • Hypertension    • Sciatic nerve pain      Past Surgical History:   Procedure Laterality Date   • BLADDER SURGERY     • HYSTERECTOMY     • JOINT REPLACEMENT      bilateral knees          OT ASSESSMENT FLOWSHEET (last 72 hours)      OT Evaluation       17 950           Document Type evaluation  -SD    Subjective Information agree to therapy;no complaints  -SD    Patient Effort, Rehab Treatment adequate  -SD    Symptoms Noted During/After Treatment        Patient Profile Review yes  -SD    Onset of Illness/Injury or Date of Surgery Date 17  -SD    Referring Physician Mario  -SD    General Observations Pt suipin in bed.   -SD    Pertinent History Of Current Problem Pt brought to ER by family due to increased confusion at home.  Pt with a hsitory of dementia.  She is a poor historian.  She was oriented to name only.  She was not able to provide any information regarding her home situation or prior function.  Pt needs to be in an environment with 24 hour care due to her cogntive status.   -SD    Precautions/Limitations fall precautions   confusion  -SD    Prior Level of Function --   unable to determine due to cogntive status.  pt needs 24 hr   -SD    Equipment Currently Used at Home --    unable to determine due to patient's cognitive status  -SD    Plans/Goals Discussed With patient;agreed upon  -SD    Risks Reviewed patient:;LOB  -SD    Benefits Reviewed patient:   maintain prior functional status  -SD    Barriers to Rehab cognitive status  -SD       Lives With --   lives with  per chart.  pt unable to recall  -SD    Living Arrangements     Home Accessibility     Stair Railings at Home     Type of Financial/Environmental Concern     Transportation Available     Living Environment Comment pt unable to report on living environment due to her cognitive status  -SD       Date of Referral to OT 06/11/17  -SD    OT Diagnosis weakness  -SD    Prognosis guarded due to cognitive status  -SD    Functional Level At Time Of Evaluation Pt was able to perform bed moility and sit to stand transfers with CGA/min assist with verbal and tactile cues.  Pt declined functional mobility activity.  Anticipate patient to to require min/mod assist with adl's with direct supervision due to her cognitive status.    -SD    Impairments Found (describe specific impairments) arousal, attention, and cognition;gait, locomotion, and balance  -SD    Patient/Family Goals Statement none stated  -SD    Criteria for Skilled Therapeutic Interventions Met other (see comments)   need to determine prior status with family  -SD    Rehab Potential fair, will monitor progress closely  -SD    Therapy Frequency other (see comments)   will establish after PLOF is determined  -SD    Anticipated Discharge Disposition other (see comments)   pt needs 24 hour care,  if not available at home, ECF rec  -SD       Pain Assessment No/denies pain  -SD       Current Cognitive/Communication Assessment impaired  -SD    Orientation Status person   oriented to name only  -SD    Follows Commands/Answers Questions 25% of the time;able to follow single-step instructions;needs cueing;needs repetition;needs increased time  -SD    Personal Safety decreased  "insight to deficits;decreased awareness, need for safety;decreased awareness, need for assist  -SD    Personal Safety Interventions gait belt;fall prevention program maintained;nonskid shoes/slippers when out of bed;supervised activity  -SD       General ROM Detail Pt not able to follow directions for rom/MMT.    -SD       General MMT Assessment Detail Pt not able to follow directions for rom/MMT.    -SD          Bed Mobility, Assistive Device bed rails;head of bed elevated  -SD    Bed Mob, Supine to Sit, [place] contact guard assist;verbal cues required  -SD    Bed Mob, Sit to Supine, [place] minimum assist (75% patient effort);verbal cues required  -SD    Bed Mobility, Comment pt required verbal/tactile cues due to cognitive statua  -SD       Transfers, Sit-Stand [place] contact guard assist;minimum assist (75% patient effort)  -SD    Transfers, Stand-Sit [place] contact guard assist;minimum assist (75% patient effort)  -SD    Transfers, Sit-Stand-Sit, Assist Device rolling walker  -SD    Transfer, Comment pt required verbal/tactile cues due to cognitive statua  -SD       Functional Mobility- Comment pt declined mobility activity.  :\"I don't think I should get up\"  -SD       LB Bathing Assess/Train, Comment Anticipate min/mod assist for adl's due to her cogntiive status to ensure safety and adequate hygiene.   -SD       LB Dressing Assess/Train, Comment Anticipate min/mod assist for adl's due to her cogntiive status to ensure safety and adequate hygiene.   -SD       Planned Therapy Interventions other (see comments)   caregiver education/home safety  -SD       Pre-Treatment Position in bed  -SD    Post Treatment Position bed  -SD    In Bed supine;call light within reach;encouraged to call for assist;exit alarm on  -SD      06/11/17 1630 06/11/17 1628 06/11/17 1541          General Information    Equipment Currently Used at Home cane, straight  -CC wheelchair;walker, rolling;commode  -CC       " Functional Level Prior    Ambulation  3-->assistive equipment and person  -CC       Transferring  3-->assistive equipment and person  -CC       Toileting  3-->assistive equipment and person  -CC       Bathing  2-->assistive person  -CC       Dressing  2-->assistive person  -CC       Eating  0-->independent  -CC       Communication  0-->understands/communicates without difficulty  -CC       Swallowing  0-->swallows foods/liquids without difficulty  -CC       Prior Functional Level Comment  n/a  -CC         User Key  (r) = Recorded By, (t) = Taken By, (c) = Cosigned By    Initials Name Effective Dates    CC Allie Green RN 06/16/16 -     KEITH Stinson, OTR 06/22/16 -     BP Lew Sewell, PT 12/01/15 -            Occupational Therapy Education     Title: PT OT SLP Therapies (Active)     Topic: Occupational Therapy (Active)     Point: ADL training (Active)    Description: Instruct learner(s) on proper safety adaptation and remediation techniques during self care or transfers.   Instruct in proper use of assistive devices.    Learning Progress Summary    Learner Readiness Method Response Comment Documented by Status   Patient Acceptance E NR education regarding OT services and benefits of activity SD 06/12/17 1113 Active                      User Key     Initials Effective Dates Name Provider Type Discipline    SD 06/22/16 -  Ace Stinson, OTR Occupational Therapist OT                  OT Recommendation and Plan  Anticipated Discharge Disposition: home with 24/7 care, extended care facility (If 24 hour care is not available at home, rec long term care)  Planned Therapy Interventions: other (see comments) (caregiver education/home safety)  Therapy Frequency: other (see comments) (will establish after PLOF is determined)  Plan of Care Review  Outcome Summary/Follow up Plan: OT evaluation completed.  Pt was pleasantly confused.  She was able to perform bed moblity and sit to stand transfers  with CGA/min assist with verbal and tactile cues.  Pt declined functional mobility activity.  Anticipate patient to to require min/mod assist with adl's with direct supervision (to ensure adequate hygiene) due to her cognitive status.  Pt was unable to provide information regarding her prior level of function or home situation due to her confusion.  Rec environment with 24 hour care.  If this is not available at home, placement in a  LTC factilty would be appropriate.            OT Goals       06/12/17 1120          Patient Education OT STG    Patient Education OT STG, Date Established 06/12/17  -SD      Patient Education OT STG, Time to Achieve 2 days  -SD      Patient Education OT STG, Education Type home safety  -SD      Patient Education OT STG, Additional Goal education with caregiver regarding recommendation for 24 hour care and direct supervision with adl's due to her cognitive status  -SD        User Key  (r) = Recorded By, (t) = Taken By, (c) = Cosigned By    Initials Name Provider Type    KEITH Stinson, OTR Occupational Therapist                Outcome Measures       06/12/17 0950 06/12/17 0941       How much help from another person do you currently need...    Turning from your back to your side while in flat bed without using bedrails?  3  -BP     Moving from lying on back to sitting on the side of a flat bed without bedrails?  3  -BP     Moving to and from a bed to a chair (including a wheelchair)?  1  -BP     Standing up from a chair using your arms (e.g., wheelchair, bedside chair)?  3  -BP     Climbing 3-5 steps with a railing?  1  -BP     To walk in hospital room?  1  -BP     AM-PAC 6 Clicks Score  12  -BP     How much help from another is currently needed...    Putting on and taking off regular lower body clothing?  2  -SD     Bathing (including washing, rinsing, and drying)  2  -SD     Toileting (which includes using toilet bed pan or urinal)  2  -SD     Putting on and taking off regular  upper body clothing  3  -SD     Taking care of personal grooming (such as brushing teeth)  3  -SD     Eating meals  3  -SD     Score  15  -SD     Functional Assessment    Outcome Measure Options AM-PAC 6 Clicks Daily Activity (OT)  -SD AM-PAC 6 Clicks Basic Mobility (PT)  -BP       User Key  (r) = Recorded By, (t) = Taken By, (c) = Cosigned By    Initials Name Provider Type    SD Ace Stinson OTR Occupational Therapist    BP Lew Sewell, PT Physical Therapist          Time Calculation:   OT Start Time: 0935  OT Stop Time: 0950  OT Time Calculation (min): 15 min    Therapy Charges for Today     Code Description Service Date Service Provider Modifiers Qty    48971642226  OT EVAL MOD COMPLEXITY 1 6/12/2017 DIMITRIS Bueno GO 1    77688858887 HC OT SELFCARE CURRENT 6/12/2017 DIMITRIS Bueno CK 1    50111159710 HC OT SELFCARE PROJECTED 6/12/2017 DIMITRIS Bueno CK 1          OT G-codes  OT Professional Judgement Used?: Yes (pt with functional deficits due to cognitiion and weakness)  OT Functional Scales Options: AM-PAC 6 Clicks Daily Activity (OT)  Score: 15  Functional Limitation: Self care  Self Care Current Status (): At least 40 percent but less than 60 percent impaired, limited or restricted  Self Care Goal Status (): At least 40 percent but less than 60 percent impaired, limited or restricted    Ace Stinson OTR  6/12/2017

## 2017-06-12 NOTE — NURSING NOTE
[facility] Planning [facility]  [facility]     Patient Name: Fernanda Spivey  MRN: [id number]  Today's Date: 6/12/2017    Admit Date: 6/11/2017          Discharge Needs Assessment       06/12/17 1235    Living Environment    Lives With spouse    Living Arrangements house    Transportation Available family or friend will provide    Living Environment Comment patient lives with  in a one level home.    Living Environment    Provides Primary Care For no one, unable/limited ability to care for self    Living Arrangement Comments patient lives with  in a one level home.    Discharge Needs Assessment    Concerns To Be Addressed discharge planning concerns    Readmission Within The Last 30 Days no previous admission in last 30 days    Anticipated Changes Related to Illness --   will continue to assess    Equipment Currently Used at Home walker, rolling;commode;wheelchair    Equipment Needed After Discharge --   will continue to assess    Discharge Planning Comments spoke with  at bedside. he states he lives with wife in a one level home on [address]. no HH or home O2. recently got a w/c from [facility]. she has a rolling walker and BSC. uses [facility] in [place] and denies having trouble paying for medications.  provides transportation, housekeeping and cooking. he assist with ADL's as needed. he states his son is involved some and grandson has been helping too. they do not have a living will and he is not interested in any information. he did bring in POA paperwork and copy was made. plan is home when medically stable.  provided HH list, [facility] Transition [place] and business card. signed MOON and denied copy. will continue to follow.               Discharge Plan       06/12/17 1247    Case Management/Social Work Plan    Plan plan is home when medically stable.     Patient/Family In Agreement With Plan yes    Additional Comments spoke with  at bedside. he states he lives with wife  in a one level home on [address]. no HH or home O2. recently got a w/c from [facility]. she has a rolling walker and BSC. uses [facility] in [place] and denies having trouble paying for medications.  provides transportation, housekeeping and cooking. he assist with ADL's as needed. he states his son is involved some and grandson has been helping too. they do not have a living will and he is not interested in any information. he did bring in POA paperwork and copy was made. plan is home when medically stable.  provided HH list, [facility] Transition [place] and business card. signed MOON and denied copy. will continue to follow.       06/12/17 1006    Case Management/Social Work Plan    Additional Comments return call from  and he will be in today around 12 noon to talk with . will continue to follow.         Discharge Placement     No information found                Demographic Summary       06/12/17 1235    Referral Information    Admission Type observation    Arrived From home or self-care    Referral Source admission list    Reason For Consult discharge planning    Record Reviewed medical record    Contact Information    Permission Granted to Share Information With             Functional Status     None            Psychosocial     None            Abuse/Neglect     None            Legal     None            Substance Abuse     None            Patient Forms     None          Sofía Charles, RN

## 2017-06-12 NOTE — NURSING NOTE
Continued Stay Note  [facility]     Patient Name: Fernanda Spivey  MRN: [id number]  Today's Date: 6/12/2017    Admit Date: 6/11/2017          Discharge Plan       06/12/17 0907    Case Management/Social Work Plan    Additional Comments attempted to speak to patient at bedside. AAOx1. dx of dementia. LVM for . awaiting call back. will continue to follow.               Discharge Codes     None            Sofía Charles RN

## 2017-06-12 NOTE — NURSING NOTE
Continued Stay Note  [facility]     Patient Name: Fernanda Spivey  MRN: [id number]  Today's Date: 6/12/2017    Admit Date: 6/11/2017          Discharge Plan       06/12/17 1006    Case Management/Social Work Plan    Additional Comments return call from  and he will be in today around 12 noon to talk with . will continue to follow.       06/12/17 0907    Case Management/Social Work Plan    Additional Comments attempted to speak to patient at bedside. AAOx1. dx of dementia. LVM for . awaiting call back. will continue to follow.               Discharge Codes     None            Sofía hCarles RN

## 2017-06-12 NOTE — THERAPY EVALUATION
Acute Care - Physical Therapy Initial Evaluation   Chelsea Diallo     Patient Name: Fernanda Spivey  : 1929  MRN: [id number]  Today's Date: 2017   Onset of Illness/Injury or Date of Surgery Date: 17  Date of Referral to PT: 17  Referring Physician: Dr. Martin       Admit Date: 2017     Visit Dx:    ICD-10-CM ICD-9-CM   1. Acute UTI N39.0 599.0   2. Altered mental status, unspecified R41.82 780.97   3. Weakness R53.1 780.79   4. Renal insufficiency N28.9 593.9     Patient Active Problem List   Diagnosis   • Acute UTI   • Generalized weakness     Past Medical History:   Diagnosis Date   • Alzheimer's dementia    • Arthritis    • Dementia    • GERD (gastroesophageal reflux disease)    • Hyperlipidemia    • Hypertension    • Sciatic nerve pain      Past Surgical History:   Procedure Laterality Date   • BLADDER SURGERY     • HYSTERECTOMY     • JOINT REPLACEMENT      bilateral knees          PT ASSESSMENT (last 72 hours)      PT Evaluation       17 0941 17 0737    Rehab Evaluation    Document Type evaluation  -BP     Subjective Information agree to therapy;no complaints  -BP     Patient Effort, Rehab Treatment adequate  -BP     Symptoms Noted During/After Treatment none  -BP     General Information    Patient Profile Review yes  -BP     Onset of Illness/Injury or Date of Surgery Date 17  -BP     Referring Physician Dr. Martin   -BP     General Observations Patient supine with HOB elevated. Resting   -BP     Pertinent History Of Current Problem Patient presents to ED with family with reports of increased confusion/Altered mental status. Patient with history of dementia. Patient oriented to name only. Unable to provide any prior history or prior level of function. Patient pleasantly confused throughout evaluation.   -BP     Precautions/Limitations fall precautions   cognitive status  -BP     Prior Level of Function --   unable to obtain due to poor cognition  -BP      Equipment Currently Used at Home --   unable to obtain due to poor cognition  -BP     Plans/Goals Discussed With patient;agreed upon  -BP     Risks Reviewed patient:;LOB  -BP     Benefits Reviewed patient:;improve function;increase independence;increase strength  -BP     Barriers to Rehab cognitive status  -BP     Living Environment    Lives With --   per chart review lives with    -BP     Living Environment Comment unable to obtain due to poor cognition  -BP     Clinical Impression    Date of Referral to PT 06/11/17  -BP     PT Diagnosis Decreased functional mobility  -BP     Criteria for Skilled Therapeutic Interventions Met yes  -BP     Rehab Potential fair, will monitor progress closely   due to cognition  -BP     Pain Assessment    Pain Assessment No/denies pain  -BP     Cognitive Assessment/Intervention    Current Cognitive/Communication Assessment impaired  -BP     Orientation Status --   oriented to name only   -BP     Follows Commands/Answers Questions 25% of the time;able to follow single-step instructions;needs cueing;needs increased time;needs repetition  -BP     Personal Safety decreased awareness, need for assist;decreased awareness, need for safety;decreased insight to deficits;unaware of cognitive deficits  -BP     Personal Safety Interventions gait belt;nonskid shoes/slippers when out of bed  -BP     ROM (Range of Motion)    General ROM Detail B LE AROM WFL  -BP     MMT (Manual Muscle Testing)    General MMT Assessment Detail Unable to MMT secondary to poor cognition and inability to follow commands  -BP     Muscle Tone Assessment    Muscle Tone Assessment      Bilateral Upper Extremities Muscle Tone Assessment      Bilateral Lower Extremities Muscle Tone Assessment      Bed Mobility, Assessment/Treatment    Bed Mobility, Assistive Device bed rails;head of bed elevated  -BP     Bed Mob, Supine to Sit, [place] contact guard assist;verbal cues required  -BP     Bed Mob, Sit to Supine,  "[place] minimum assist (75% patient effort);verbal cues required  -BP     Bed Mobility, Comment Patient requires step by step verbal/tactile cues for sequencing supine to/from sit transfer   -BP     Transfer Assessment/Treatment    Transfers, Sit-Stand [place] contact guard assist;minimum assist (75% patient effort);verbal cues required  -BP     Transfers, Stand-Sit [place] contact guard assist;minimum assist (75% patient effort);verbal cues required  -BP     Transfers, Sit-Stand-Sit, Assist Device rolling walker  -BP     Transfer, Comment Patient requires verbal and tactile cues for hand placement, unable to correct. Patient declines bed to chair transfer, states \"I don't think I should today.\" Educated on benefits of being out of bed. Patient continues to pleasantly decline.   -BP     Gait Assessment/Treatment    Gait, Comment Patient declines mobility today.  -BP     Positioning and Restraints    Pre-Treatment Position in bed  -BP     Post Treatment Position bed  -BP     In Bed supine;call light within reach;encouraged to call for assist;exit alarm on  -BP       User Key  (r) = Recorded By, (t) = Taken By, (c) = Cosigned By    Initials Name Provider Type    CC Allie Green RN Registered Nurse    BP Lew Sewell, PT Physical Therapist          Physical Therapy Education     Title: PT OT SLP Therapies (Active)     Topic: Physical Therapy (Active)     Point: Mobility training (Active)    Learning Progress Summary    Learner Readiness Method Response Comment Documented by Status   Patient Acceptance E NR  BP 06/12/17 1017 Active                      User Key     Initials Effective Dates Name Provider Type [facility] 12/01/15 -  Lew Sewell PT Physical Therapist PT                PT Recommendation and Plan  Anticipated Equipment Needs At Discharge:  (will continue to assess)  Anticipated Discharge Disposition: home with 24/7 care, extended care facility  Planned Therapy " "Interventions: bed mobility training, gait training, transfer training  PT Frequency: daily  Plan of Care Review  Plan Of Care Reviewed With: patient  Outcome Summary/Follow up Plan: PT Evaluation Complete: Patient completes supine to/from sit transfer with CGA/Min A and sit to/from stand transfer with CGA/Min A. Patient refuses mobility training today, states \"I don't think I should today.\" Patient oriented to name only, unable to obtain any prior level of function or history. Family not present. Patient would benefit from skilled PT services to address functional mobility deficts and maximize functional potential. Recommend 24 hour care or LTC placement if family unable to provide care.           IP PT Goals       06/12/17 1025          Bed Mobility PT STG    Bed Mobility PT STG, Date Established 06/12/17  -BP      Bed Mobility PT STG, Time to Achieve 3 days  -BP      Bed Mobility PT STG, Activity Type supine to sit/sit to supine  -BP      Bed Mobility PT STG, [place] Level supervision required;verbal cues required  -BP      Transfer Training PT STG    Transfer Training PT STG, Date Established 06/12/17  -BP      Transfer Training PT STG, Time to Achieve 3 days  -BP      Transfer Training PT STG, Activity Type sit to stand/stand to sit  -BP      Transfer Training PT STG, [place] Level supervision required;verbal cues required  -BP      Gait Training PT STG    Gait Training Goal PT STG, Date Established 06/12/17  -BP      Gait Training Goal PT STG, Time to Achieve 3 days  -BP      Gait Training Goal PT STG, [place] Level contact guard assist;verbal cues required  -BP      Gait Training Goal PT STG, Assist Device walker, rolling  -BP      Gait Training Goal PT STG, Distance to Achieve 50  -BP        User Key  (r) = Recorded By, (t) = Taken By, (c) = Cosigned By    Initials Name Provider Type    BP Lew Sewell, PT Physical Therapist                Outcome Measures       06/12/17 0941          How " much help from another person do you currently need...    Turning from your back to your side while in flat bed without using bedrails? 3  -BP      Moving from lying on back to sitting on the side of a flat bed without bedrails? 3  -BP      Moving to and from a bed to a chair (including a wheelchair)? 1  -BP      Standing up from a chair using your arms (e.g., wheelchair, bedside chair)? 3  -BP      Climbing 3-5 steps with a railing? 1  -BP      To walk in hospital room? 1  -BP      AM-PAC 6 Clicks Score 12  -BP      Functional Assessment    Outcome Measure Options AM-PAC 6 Clicks Basic Mobility (PT)  -BP        User Key  (r) = Recorded By, (t) = Taken By, (c) = Cosigned By    Initials Name Provider Type    BP Lew Sewell PT Physical Therapist           Time Calculation:         PT Charges       06/12/17 1022          Time Calculation    Start Time 0941  -BP      Stop Time 0953  -BP      Time Calculation (min) 12 min  -BP      PT Received On 06/12/17  -BP      PT - Next Appointment 06/13/17  -BP        User Key  (r) = Recorded By, (t) = Taken By, (c) = Cosigned By    Initials Name Provider Type    BP Lew Sewell PT Physical Therapist          Therapy Charges for Today     Code Description Service Date Service Provider Modifiers Qty    39945997274 HC PT MOBILITY CURRENT 6/12/2017 Lew Sewell, PT GP, CJ 1    23597118192 HC PT MOBILITY PROJECTED 6/12/2017 Lew Sewell PT GP, CI 1    03046989694 HC PT EVAL LOW COMPLEXITY 1 6/12/2017 Lew Sewell PT GP 1          PT G-Codes  PT Professional Judgement Used?: Yes  Outcome Measure Options: AM-PAC 6 Clicks Basic Mobility (PT)  Functional Limitation: Mobility: Walking and moving around  Mobility: Walking and Moving Around Current Status (): At least 20 percent but less than 40 percent impaired, limited or restricted  Mobility: Walking and Moving Around Goal Status (): At least 1 percent but less than 20 percent impaired, limited or  jarrell Sewell, PT  6/12/2017

## 2017-06-12 NOTE — PLAN OF CARE
Problem: Patient Care Overview (Adult)  Goal: Plan of Care Review  Outcome: Ongoing (interventions implemented as appropriate)    06/11/17 2899   Coping/Psychosocial Response Interventions   Plan Of Care Reviewed With patient   Patient Care Overview   Progress progress towards functional goals is fair

## 2017-06-12 NOTE — NURSING NOTE
"Continued Stay Note   Chelsea Diallo     Patient Name: Fernanda Spivey  MRN: [id number]  Today's Date: 6/12/2017    Admit Date: 6/11/2017          Discharge Plan       06/12/17 1556    Case Management/Social Work Plan    Additional Comments received call from Nina-Zuleyka RN stating patient's son is at bedside. spoke with son, daughter in law and grandson at bedside. information given to son about the conversations  had with his father. he states that he will talk with his father tonight regarding dc plans. his father is 80 y/o. discussed briefly if nursing home or rehab stay is needed it would be private pay. family aware of observation status. added son's contact information to chart. grandson had concerns of getting Right leg x-ray as well. spoke with Nina and she is aware. will continue to follow.      06/12/17 9135    Case Management/Social Work Plan    Additional Comments received call from nursing and MD regarding dc plans. 2nd visit with  at bedside. 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 when asked why patient was brought to hospital and he states \"I brought her here because of a bladder infection\". when asked if he was wanting nursing home placement for patient he states \"absolutely not\". he has a daily routine starting with waking her up, toileting and fixing breakfast, he puts her on the couch while he is out, comes back to fix lunch, toilets patient, he goes back out and comes back to fix supper. he states they are still able to sleep in the same bed. he states \"I know her better than anyone here\". he states patient does not ambulate much anymore and does not have a wondering behavior. he says his son works at [employer] training other pilots. his grandson is on vacation to [place] for a week and will be back & able to assist. he states he has a friend that is a lady that will be willing to stay with patient during the day while he works on the " farm. multiple pamphlets for private duty given but  refused to take them.  he was open to HH if that was ordered. will continue to follow.               Discharge Codes     None            Sofía Charles RN

## 2017-06-12 NOTE — PLAN OF CARE
Problem: Inpatient Occupational Therapy  Goal: Patient Education Goal STG- OT    06/12/17 1120   Patient Education OT STG   Patient Education OT STG, Date Established 06/12/17   Patient Education OT STG, Time to Achieve 2 days   Patient Education OT STG, Education Type home safety   Patient Education OT STG, Additional Goal education with caregiver regarding recommendation for 24 hour care and direct supervision with adl's due to her cognitive status

## 2017-06-12 NOTE — PLAN OF CARE
Problem: Patient Care Overview (Adult)  Goal: Plan of Care Review  Outcome: Ongoing (interventions implemented as appropriate)    06/12/17 1114   Outcome Evaluation   Outcome Summary/Follow up Plan OT evaluation completed. Pt was pleasantly confused. She was able to perform bed moblity and sit to stand transfers with CGA/min assist with verbal and tactile cues. Pt declined functional mobility activity. Anticipate patient to to require min/mod assist with adl's with direct supervision (to ensure adequate hygiene) due to her cognitive status. Pt was unable to provide information regarding her prior level of function or home situation due to her confusion. Rec environment with 24 hour care. If this is not available at home, placement in a LTC factilty would be appropriate.

## 2017-06-12 NOTE — PROGRESS NOTES
"Daily Progress Note:    Subjective: Without complaints.  She is pleasantly confused    Flowsheet Rows         First Filed Value    Admission Height  64\" (162.6 cm) Documented at 06/11/2017 1149    Admission Weight  165 lb (74.8 kg) Documented at 06/11/2017 1149          Patient Vitals for the past 24 hrs:   BP Temp Temp src Pulse Resp SpO2 Height Weight   06/11/17 2247 144/74 97 °F (36.1 °C) Oral 88 16 94 % - -   06/11/17 1910 146/74 97.2 °F (36.2 °C) Oral 92 16 96 % - -   06/11/17 1538 176/86 98.3 °F (36.8 °C) Oral 86 16 95 % 64\" (162.6 cm) 174 lb 0.7 oz (78.9 kg)   06/11/17 1506 159/82 - - 90 18 95 % - -   06/11/17 1355 159/94 - - - - 93 % - -   06/11/17 1230 153/79 - - 85 16 - - -   06/11/17 1149 (!) 185/101 98.6 °F (37 °C) Oral 81 18 98 % 64\" (162.6 cm) 165 lb (74.8 kg)         Intake/Output Summary (Last 24 hours) at 06/12/17 0726  Last data filed at 06/12/17 0049   Gross per 24 hour   Intake              360 ml   Output                0 ml   Net              360 ml       Review of Systems   Unable to perform ROS: Dementia       Physical Exam   Constitutional: She appears well-developed and well-nourished.   HENT:   Head: Normocephalic.   Mouth/Throat: Oropharynx is clear and moist.   Eyes: Conjunctivae are normal.   Neck: Normal range of motion. No JVD present. No thyromegaly present.   Cardiovascular: Normal rate, regular rhythm and normal heart sounds.    No murmur heard.  Pulmonary/Chest: Effort normal and breath sounds normal. No respiratory distress. She has no wheezes. She has no rales.   Abdominal: Soft. Bowel sounds are normal. She exhibits no distension. There is no tenderness. There is no guarding.   Neurological: She is alert. She is disoriented.   Skin: Skin is warm and dry. No rash noted.   Nursing note and vitals reviewed.          Medication Review:   I have reviewed the patient's current medication list      amLODIPine 2.5 mg Oral Daily   cefuroxime 250 mg Oral Q12H   cholecalciferol 4,000 " Units Oral Daily   enoxaparin 30 mg Subcutaneous Daily   gabapentin 300 mg Oral Daily   OLANZapine 2.5 mg Oral Nightly   pantoprazole 40 mg Oral QAM           Labs:    Results from last 7 days  Lab Units 06/11/17  1158   WBC 10*3/mm3 6.00   HEMOGLOBIN g/dL 13.8   HEMATOCRIT % 40.5   PLATELETS 10*3/mm3 246       Results from last 7 days  Lab Units 06/11/17  1158   SODIUM mmol/L 139   POTASSIUM mmol/L 3.5   CHLORIDE mmol/L 99   TOTAL CO2 mmol/L 21.3*   BUN mg/dL 18   CREATININE mg/dL 1.14*   CALCIUM mg/dL 8.9   BILIRUBIN mg/dL 1.7*   ALK PHOS U/L 170*   ALT (SGPT) U/L 8   AST (SGOT) U/L 17   GLUCOSE mg/dL 117*           Lab Results (last 24 hours)     Procedure Component Value Units Date/Time    CBC Auto Differential [[id number]]  (Abnormal) Collected:  06/11/17 1158    Specimen:  Blood Updated:  06/11/17 1207     WBC 6.00 10*3/mm3      RBC 4.60 10*6/mm3      Hemoglobin 13.8 g/dL      Hematocrit 40.5 %      MCV 88.0 fL      MCH 30.0 pg      MCHC 34.1 g/dL      RDW 13.4 %      RDW-SD 43.1 fl      MPV 9.4 fL      Platelets 246 10*3/mm3      Neutrophil % 76.5 (H) %      Lymphocyte % 13.2 (L) %      Monocyte % 8.5 (H) %      Eosinophil % 1.2 %      Basophil % 0.3 %      Immature Grans % 0.3 %      Neutrophils, Absolute 4.59 10*3/mm3      Lymphocytes, Absolute 0.79 10*3/mm3      Monocytes, Absolute 0.51 10*3/mm3      Eosinophils, Absolute 0.07 (L) 10*3/mm3      Basophils, Absolute 0.02 10*3/mm3      Immature Grans, Absolute 0.02 10*3/mm3      nRBC 0.0 /100 WBC     CBC & Differential [[id number]] Collected:  06/11/17 1158    Specimen:  Blood Updated:  06/11/17 1207    Narrative:       The following orders were created for panel order CBC & Differential.  Procedure                               Abnormality         Status                     ---------                               -----------         ------                     CBC Auto Differential[[id number]]         Abnormal            Final result                 Please view  results for these tests on the individual orders.    Urinalysis With / Culture If Indicated [[id number]]  (Abnormal) Collected:  06/11/17 1141    Specimen:  Urine from Urine, Catheter Updated:  06/11/17 1213     Color, UA Yellow     Appearance, UA Slightly Cloudy (A)     pH, UA 7.5     Specific Gravity, UA 1.020     Glucose, UA Negative     Ketones, UA Negative     Bilirubin, UA Negative     Blood, UA Trace (A)     Protein, UA Negative     Leuk Esterase, UA Trace (A)     Nitrite, UA Negative     Urobilinogen, UA 0.2 E.U./dL    Urinalysis, Microscopic Only [[id number]]  (Abnormal) Collected:  06/11/17 1141    Specimen:  Urine from Urine, Catheter Updated:  06/11/17 1214     RBC, UA 0-2 (A) /HPF      WBC, UA 3-5 (A) /HPF      Bacteria, UA Trace (A) /HPF      Squamous Epithelial Cells, UA 3-6 (A) /HPF      Hyaline Casts, UA None Seen /LPF      Methodology Manual Light Microscopy    Comprehensive Metabolic Panel [[id number]]  (Abnormal) Collected:  06/11/17 1158    Specimen:  Blood Updated:  06/11/17 1229     Glucose 117 (H) mg/dL      BUN 18 mg/dL      Creatinine 1.14 (H) mg/dL      Sodium 139 mmol/L      Potassium 3.5 mmol/L      Chloride 99 mmol/L      CO2 21.3 (L) mmol/L      Calcium 8.9 mg/dL      Total Protein 6.4 g/dL      Albumin 3.40 (L) g/dL      ALT (SGPT) 8 U/L      AST (SGOT) 17 U/L      Alkaline Phosphatase 170 (H) U/L      Total Bilirubin 1.7 (H) mg/dL      eGFR Non African Amer 45 (L) mL/min/1.73      Globulin 3.0 gm/dL      A/G Ratio 1.1 g/dL      BUN/Creatinine Ratio 15.8     Anion Gap 18.7 mmol/L     Narrative:       The MDRD GFR formula is only valid for adults with stable renal function between ages 18 and 70.    Troponin [[id number]]  (Normal) Collected:  06/11/17 1158    Specimen:  Blood Updated:  06/11/17 1229     Troponin T <0.010 ng/mL     Narrative:       Troponin T Reference Ranges:  Less than 0.03 ng/mL:    Negative for AMI  0.03 to 0.09 ng/mL:      Indeterminant for AMI  Greater than 0.09 ng/mL:  Positive for AMI    CK [[id number]]  (Normal) Collected:  06/11/17 1158    Specimen:  Blood Updated:  06/11/17 1244     Creatine Kinase 100 U/L     Urine Culture [[id number]] Collected:  06/11/17 1856    Specimen:  Urine from Urine, Catheter Updated:  06/11/17 1857              Radiology:  Imaging Results (last 24 hours)     Procedure Component Value Units Date/Time    CT Head Without Contrast [[id number]] Collected:  06/12/17 0706     Updated:  06/12/17 0711    Narrative:       CT HEAD, 06/11/2017:     HISTORY:  87-year-old female in the ED with two day history of confusion/mental  status change. Possible fall. Patient is unable to give detailed  history. Urinary tract infection is noted.     TECHNIQUE:  CT examination of the head without IV contrast. Radiation dose reduction  techniques were utilized, including automated exposure control and  exposure modulation based on body size.     FINDINGS:  No acute intracranial abnormality is identified. No visible skull  fracture.     Moderate generalized cerebral cortical atrophy and cerebellar atrophy.  Advanced diffuse low-attenuation white matter changes, nonspecific but  likely related to chronic small vessel disease. Chronic white matter  infarcts in the left frontal lobe and right basal ganglia region. These  findings are stable since 07/21/2012.     No evidence of intracranial hemorrhage, mass, mass effect, acute  cerebral edema or progressive ventricular enlargement. Mucosal  thickening causes partial opacification of the left sphenoid sinus.       Impression:       1. No acute intracranial abnormality.  2. Stable diffuse chronic changes as noted above.  3. Sphenoid sinus disease.  4. No change since 07/21/2012.  5. Preliminary stat report to the ED from Dr. Juma Babcock at 1338 hours  on 06/11/2017.     This report was finalized on 6/12/2017 7:09 AM by Dr. Eric Pruett MD.       XR Chest 1 View [[id number]] Collected:  06/12/17 0709     Updated:  06/12/17 0712     Narrative:       CHEST X-RAY, 06/11/2017:     HISTORY:   87-year-old female in the ED with two day history of confusion/mental  status change. Possible fall. Patient is unable to give detailed  history. Urinary tract infection is noted.     TECHNIQUE:  AP portable chest x-ray.     FINDINGS:  Shallow lung expansion with mild bibasilar atelectasis. Mid and upper  lungs are clear. No pneumothorax. Mild cardiomegaly with normal  pulmonary vascularity. Tortuous thoracic aorta.       Impression:       1. No definite active disease.  2. Shallow lung expansion with mild bibasilar atelectasis.  3. No significant change since 04/28/2017.     This report was finalized on 6/12/2017 7:10 AM by Dr. Eric Pruett MD.             Cardiology:  ECG/EMG Results (last 24 hours)     Procedure Component Value Units Date/Time    ECG 12 Lead [[id number]] Collected:  06/11/17 1202     Updated:  06/11/17 1204          I have reviewed consult notes.    Assessment and Plan:    1.  Mental status changes probably progression of dementia versus underlying UTI.  Await urine cultures    2.  Hypertension is well controlled on medicines we continued    3.  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  Reflux disease continue home PPI    5.  Chronic kidney disease stage III will monitor

## 2017-06-13 ENCOUNTER — APPOINTMENT (OUTPATIENT)
Dept: GENERAL RADIOLOGY | Facility: HOSPITAL | Age: 82
End: 2017-06-13

## 2017-06-13 LAB
ANION GAP SERPL CALCULATED.3IONS-SCNC: 14.3 MMOL/L
BACTERIA SPEC AEROBE CULT: ABNORMAL
BUN BLD-MCNC: 13 MG/DL (ref 8–23)
BUN/CREAT SERPL: 14.6 (ref 7–25)
CALCIUM SPEC-SCNC: 8.5 MG/DL (ref 8.8–10.5)
CHLORIDE SERPL-SCNC: 98 MMOL/L (ref 98–107)
CO2 SERPL-SCNC: 23.7 MMOL/L (ref 22–29)
CREAT BLD-MCNC: 0.89 MG/DL (ref 0.57–1)
DEPRECATED RDW RBC AUTO: 43.9 FL (ref 37–54)
ERYTHROCYTE [DISTWIDTH] IN BLOOD BY AUTOMATED COUNT: 13.4 % (ref 11.5–14.5)
GFR SERPL CREATININE-BSD FRML MDRD: 60 ML/MIN/1.73
GLUCOSE BLD-MCNC: 136 MG/DL (ref 65–99)
HCT VFR BLD AUTO: 38.8 % (ref 37–47)
HGB BLD-MCNC: 13.2 G/DL (ref 12–16)
MCH RBC QN AUTO: 30.1 PG (ref 27–31)
MCHC RBC AUTO-ENTMCNC: 34 G/DL (ref 31–37)
MCV RBC AUTO: 88.6 FL (ref 81–99)
PLATELET # BLD AUTO: 254 10*3/MM3 (ref 140–500)
PMV BLD AUTO: 9.2 FL (ref 7.4–10.4)
POTASSIUM BLD-SCNC: 3.5 MMOL/L (ref 3.5–5.2)
RBC # BLD AUTO: 4.38 10*6/MM3 (ref 4.2–5.4)
SODIUM BLD-SCNC: 136 MMOL/L (ref 136–145)
WBC NRBC COR # BLD: 4.93 10*3/MM3 (ref 4.8–10.8)

## 2017-06-13 PROCEDURE — 97110 THERAPEUTIC EXERCISES: CPT

## 2017-06-13 PROCEDURE — 73560 X-RAY EXAM OF KNEE 1 OR 2: CPT

## 2017-06-13 PROCEDURE — 80048 BASIC METABOLIC PNL TOTAL CA: CPT | Performed by: FAMILY MEDICINE

## 2017-06-13 PROCEDURE — 97535 SELF CARE MNGMENT TRAINING: CPT

## 2017-06-13 PROCEDURE — 96372 THER/PROPH/DIAG INJ SC/IM: CPT

## 2017-06-13 PROCEDURE — G0378 HOSPITAL OBSERVATION PER HR: HCPCS

## 2017-06-13 PROCEDURE — 25010000002 ENOXAPARIN PER 10 MG: Performed by: HOSPITALIST

## 2017-06-13 PROCEDURE — 85027 COMPLETE CBC AUTOMATED: CPT | Performed by: FAMILY MEDICINE

## 2017-06-13 PROCEDURE — 73501 X-RAY EXAM HIP UNI 1 VIEW: CPT

## 2017-06-13 RX ADMIN — GABAPENTIN 300 MG: 300 CAPSULE ORAL at 11:05

## 2017-06-13 RX ADMIN — OLANZAPINE 2.5 MG: 2.5 TABLET, FILM COATED ORAL at 20:39

## 2017-06-13 RX ADMIN — VITAMIN D, TAB 1000IU (100/BT) 4000 UNITS: 25 TAB at 11:05

## 2017-06-13 RX ADMIN — PANTOPRAZOLE SODIUM 40 MG: 40 TABLET, DELAYED RELEASE ORAL at 11:05

## 2017-06-13 RX ADMIN — ENOXAPARIN SODIUM 30 MG: 30 INJECTION SUBCUTANEOUS at 11:04

## 2017-06-13 RX ADMIN — AMLODIPINE BESYLATE 2.5 MG: 2.5 TABLET ORAL at 11:05

## 2017-06-13 RX ADMIN — CEFUROXIME AXETIL 250 MG: 250 TABLET ORAL at 17:06

## 2017-06-13 NOTE — THERAPY TREATMENT NOTE
Acute Care - Physical Therapy Treatment Note   [facility]     Patient Name: Fernanda Spivey  : 1929  MRN: [id number]  Today's Date: 2017  Onset of Illness/Injury or Date of Surgery Date: 17  Date of Referral to PT: 17  Referring Physician: Mario    Admit Date: 2017    Visit Dx:    ICD-10-CM ICD-9-CM   1. Acute UTI N39.0 599.0   2. Altered mental status, unspecified R41.82 780.97   3. Weakness R53.1 780.79   4. Renal insufficiency N28.9 593.9     Patient Active Problem List   Diagnosis   • Acute UTI   • Generalized weakness               Adult Rehabilitation Note       17 1000 17 0945       Rehab Assessment/Intervention    Discipline occupational therapist  -SD physical therapist  -BP     Document Type therapy note (daily note)  -SD therapy note (daily note)  -BP     Subjective Information agree to therapy;no complaints  -SD agree to therapy;no complaints  -BP     Patient Effort, Rehab Treatment adequate  -SD adequate  -BP     Symptoms Noted During/After Treatment  fatigue  -BP     Precautions/Limitations fall precautions   confusion  -SD fall precautions   cognitive status  -BP     Recorded by [SD] Ace Howellr, OTR [BP] Lew Sewell, PT     Pain Assessment    Pain Assessment No/denies pain  -SD No/denies pain   denies pain at rest and with activity  -BP     Recorded by [SD] Ace Stinson, OTR [BP] Lew Sewell, PT     Cognitive Assessment/Intervention    Current Cognitive/Communication Assessment impaired  -SD impaired  -BP     Orientation Status oriented to;person  -SD oriented to;other (see comments)   to name only   -BP     Follows Commands/Answers Questions  50% of the time;able to follow single-step instructions;needs cueing;needs increased time;needs repetition  -BP     Personal Safety decreased insight to deficits;decreased awareness, need for safety;decreased awareness, need for assist  -SD      Personal Safety Interventions gait belt;fall  prevention program maintained;nonskid shoes/slippers when out of bed;supervised activity  -SD fall prevention program maintained;nonskid shoes/slippers when out of bed  -BP     Recorded by [SD] Ace Stinson, OTR [BP] Lew Sewell, PT     Bed Mobility, Assessment/Treatment    Bed Mobility, Assistive Device bed rails;head of bed elevated  -SD bed rails;head of bed elevated  -BP     Bed Mob, Supine to Sit, [place] minimum assist (75% patient effort);verbal cues required  -SD minimum assist (75% patient effort);verbal cues required  -BP     Bed Mobility, Comment  Patient requires step by step sequencing for transfer to EOB  -BP     Recorded by [SD] Ace Stinson, OTR [BP] Lew Sewell, ALFONZO     Transfer Assessment/Treatment    Transfers, Bed-Chair [place]  minimum assist (75% patient effort);moderate assist (50% patient effort);maximum assist (25% patient effort);verbal cues required  -BP     Transfers, Bed-Chair-Bed, Assist Device  rolling walker  -BP     Transfers, Sit-Stand [place] contact guard assist;verbal cues required  -SD contact guard assist;verbal cues required  -BP     Transfers, Stand-Sit [place] moderate assist (50% patient effort);maximum assist (25% patient effort);verbal cues required  -SD contact guard assist;verbal cues required  -BP     Transfers, Sit-Stand-Sit, Assist Device rolling walker  -SD      Transfer, Comment Pt attempted to sit in recliner before she was directly in front of the chair.  Verbal/tactle cues and physical assistance was required to safely guide/lower her into the recliner.  -SD Patient requires verbal cues for hand placement with transfers. Unable to correct, consistently grabs for walker with transfer to stand. Patient took 4 steps with pivot to bedside chair, initially requiring min A to manage assistive device and maintain upright, advancing to requiring max A to safely pivot to sit in recliner. Patient attempted to sit in chair pior arrival,  requiring max assist to safely descend. Patient requires step by step verbal cues for sequencing. Patient with soiled brief, requiring multiple stands to clean patient and baljinder knew brief. Patient required min  to mod A x 2 to maintain static standing while changing brief.   -BP     Recorded by [SD] Ace Stinson, OTR [BP] Lew Sewell, PT     Gait Assessment/Treatment    Gait, Comment  deferred today due to fatigue and difficulty following commands  -BP     Recorded by  [BP] Lew Sewell, ALFONZO     Functional Mobility    Functional Mobility- Ind. Level minimum assist (75% patient effort);moderate assist (50% patient effort)  -SD      Functional Mobility- Device rolling walker  -SD      Functional Mobility-Distance (Feet) 6  -SD      Functional Mobility- Safety Issues balance decreased during turns;sequencing ability decreased;step length decreased;weight-shifting ability decreased  -SD      Functional Mobility- Comment Pt required verbal cues to intiate activity and for sequencing during mobility.    -SD      Recorded by [SD] Ace Stinson OTR      Toileting Assessment/Training    Toileting Assess/Train, Indepen Level dependent (less than 25% patient effort)  -SD      Toileting Assess/Train, Comment pt was incontinent.  She was dependent for hygiene and management of incontinent brief.    -SD      Recorded by [SD] Ace Stinson OTR      Positioning and Restraints    Pre-Treatment Position in bed  -SD in bed  -BP     Post Treatment Position chair  -SD chair   reclined, exit alarm on, notified CNA, call light in reach  -BP     In Chair reclined;sitting;encouraged to call for assist;exit alarm on;call light within reach  -SD      Recorded by [SD] Ace Stinson, OTR [BP] Lew Sewell, ALFONZO       User Key  (r) = Recorded By, (t) = Taken By, (c) = Cosigned By    Initials Name Effective Dates    SD Ace Stinson OTR 06/22/16 -     BP Lew Sewell PT 12/01/15 -                 IP PT Goals        06/12/17 1025          Bed Mobility PT STG    Bed Mobility PT STG, Date Established 06/12/17  -BP      Bed Mobility PT STG, Time to Achieve 3 days  -BP      Bed Mobility PT STG, Activity Type supine to sit/sit to supine  -BP      Bed Mobility PT STG, [place] Level supervision required;verbal cues required  -BP      Transfer Training PT STG    Transfer Training PT STG, Date Established 06/12/17  -BP      Transfer Training PT STG, Time to Achieve 3 days  -BP      Transfer Training PT STG, Activity Type sit to stand/stand to sit  -BP      Transfer Training PT STG, [place] Level supervision required;verbal cues required  -BP      Gait Training PT STG    Gait Training Goal PT STG, Date Established 06/12/17  -BP      Gait Training Goal PT STG, Time to Achieve 3 days  -BP      Gait Training Goal PT STG, [place] Level contact guard assist;verbal cues required  -BP      Gait Training Goal PT STG, Assist Device walker, rolling  -BP      Gait Training Goal PT STG, Distance to Achieve 50  -BP        User Key  (r) = Recorded By, (t) = Taken By, (c) = Cosigned By    Initials Name Provider Type    BP Lew Sewell, ALFONZO Physical Therapist          Physical Therapy Education     Title: PT OT SLP Therapies (Active)     Topic: Physical Therapy (Active)     Point: Mobility training (Active)    Learning Progress Summary    Learner Readiness Method Response Comment Documented by Status   Patient Acceptance E NR  BP 06/13/17 1033 Active    Acceptance E NR  BP 06/12/17 1017 Active                      User Key     Initials Effective Dates Name Provider Type [facility] 12/01/15 -  Lew Sewell PT Physical Therapist PT                    PT Recommendation and Plan  Anticipated Equipment Needs At Discharge:  (will continue to assess)  Anticipated Discharge Disposition: home with 24/7 care, extended care facility  Planned Therapy Interventions: bed mobility training, gait training, transfer training  PT Frequency:  daily  Plan of Care Review  Plan Of Care Reviewed With: patient  Outcome Summary/Follow up Plan: PT: Patient required min A for bed mobility, CGA for sit to stand transfers, and varying levels of assist from min A to max A with bed to chair transfer. Patient required increased assist secondary to decreased safety awareness and attempt to sit in chair prior to arrival. Patient requires step by step sequencing for all functional tasks/mobility secondasry to poor cognition. Patient demonstrates no carry over from session to session or within session. Continue to recommend 24 hour care/assist or extended nursing care if this level of care is not able to provided at home.           Outcome Measures       06/13/17 1000 06/13/17 0945 06/12/17 0950    How much help from another person do you currently need...    Turning from your back to your side while in flat bed without using bedrails?  3  -BP     Moving from lying on back to sitting on the side of a flat bed without bedrails?  3  -BP     Moving to and from a bed to a chair (including a wheelchair)?  2  -BP     Standing up from a chair using your arms (e.g., wheelchair, bedside chair)?  3  -BP     Climbing 3-5 steps with a railing?  2  -BP     To walk in hospital room?  2  -BP     AM-PAC 6 Clicks Score  15  -BP     How much help from another is currently needed...    Putting on and taking off regular lower body clothing? 2  -SD      Bathing (including washing, rinsing, and drying) 2  -SD      Toileting (which includes using toilet bed pan or urinal) 1  -SD      Putting on and taking off regular upper body clothing 3  -SD      Taking care of personal grooming (such as brushing teeth) 3  -SD      Eating meals 3  -SD      Score 14  -SD      Functional Assessment    Outcome Measure Options  AM-PAC 6 Clicks Basic Mobility (PT)  -BP AM-PAC 6 Clicks Daily Activity (OT)  -SD      06/12/17 0941          How much help from another person do you currently need...    Turning from  your back to your side while in flat bed without using bedrails? 3  -BP      Moving from lying on back to sitting on the side of a flat bed without bedrails? 3  -BP      Moving to and from a bed to a chair (including a wheelchair)? 1  -BP      Standing up from a chair using your arms (e.g., wheelchair, bedside chair)? 3  -BP      Climbing 3-5 steps with a railing? 1  -BP      To walk in hospital room? 1  -BP      AM-PAC 6 Clicks Score 12  -BP      How much help from another is currently needed...    Putting on and taking off regular lower body clothing? 2  -SD      Bathing (including washing, rinsing, and drying) 2  -SD      Toileting (which includes using toilet bed pan or urinal) 2  -SD      Putting on and taking off regular upper body clothing 3  -SD      Taking care of personal grooming (such as brushing teeth) 3  -SD      Eating meals 3  -SD      Score 15  -SD      Functional Assessment    Outcome Measure Options AM-PAC 6 Clicks Basic Mobility (PT)  -BP        User Key  (r) = Recorded By, (t) = Taken By, (c) = Cosigned By    Initials Name Provider Type    KEITH Stinson, OTR Occupational Therapist    BP Lew Sewell PT Physical Therapist           Time Calculation:         PT Charges       06/13/17 1037          Time Calculation    Start Time 0945  -BP      Stop Time 1000  -BP      Time Calculation (min) 15 min  -BP      PT Received On 06/13/17  -BP      PT - Next Appointment 06/14/17  -BP        User Key  (r) = Recorded By, (t) = Taken By, (c) = Cosigned By    Initials Name Provider Type     Lew Sewell PT Physical Therapist          Therapy Charges for Today     Code Description Service Date Service Provider Modifiers Qty    36266816208 HC PT MOBILITY CURRENT 6/12/2017 Lew Sewell, PT GP, CJ 1    98168092602 HC PT MOBILITY PROJECTED 6/12/2017 Lew Sewell PT GP, CI 1    59455574473 HC PT EVAL LOW COMPLEXITY 1 6/12/2017 Lew Sewell PT GP 1    10135050094 HC PT THER PROC EA 15  MIN 6/13/2017 Lew Sewell, PT GP 1          PT G-Codes  PT Professional Judgement Used?: Yes  Outcome Measure Options: AM-PAC 6 Clicks Basic Mobility (PT)  Functional Limitation: Mobility: Walking and moving around  Mobility: Walking and Moving Around Current Status (): At least 20 percent but less than 40 percent impaired, limited or restricted  Mobility: Walking and Moving Around Goal Status (): At least 1 percent but less than 20 percent impaired, limited or restricted    Lew Sewell, PT  6/13/2017

## 2017-06-13 NOTE — H&P
HISTORY:  This is an 87-year-old white female, patient of Nico De Souza MD was brought into the emergency room by her  because of increasing confusion. The patient has a long history of moderate dementia, apparently has gotten worse over the last several months.  In the last 2 days she has become more and more confused, not able to do her ADLs, with increasing weakness and difficulty walking. The  denies any fever or chills. No nausea, vomiting and cough, congestion, shortness of breath or diarrhea. Apparently she has fallen, but the family has not been witnessed the fall.  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 He checks on her frequently when he is out on the farm.     PAST MEDICAL HISTORY:   1.  Hypertension.   2.  Hyperlipidemia.   3.  Multi-joint osteoarthritis.   4.  Gastroesophageal reflux disease.   5.  Chronic low back pain.     PAST SURGICAL HISTORY:   1.  Bladder reconstruction.  2.  Hysterectomy.  3.  Bilateral knee replacement.     FAMILY HISTORY: Unremarkable.    SOCIAL HISTORY:  She lives at home with her  who is  her primary caretaker.  [place]. He is out farming and she stays at home.  Her  says she has a routine. She gets up, he fixes her breakfast and then he checks on her several times during the day. She does have some incontinence issues when she cannot get to the bathroom. Otherwise, she is continent if she can make it to the bathroom. There  has been some increasing difficulty with grooming and eating recently. She does not smoke and no alcohol use.     MEDICATIONS:   1.  Acetaminophen with codeine p.r.n. pain   2.  Gabapentin 300 mg at bedtime.  3.  Prevacid 30 mg a day.  4.  Zyprexa 2.5 daily.   5.  Aldactone 25 mg daily.   6.  Amlodipine 2.5 daily.     PHYSICAL EXAMINATION:   GENERAL:  She is oriented to person only.   VITAL SIGNS: Blood pressure 160/82, respirations 18, pulse 90. She is afebrile.   HEENT: Pupils equal round and reactive  to light. Extraocular movements could not be tested.  Nose and throat Clear.  Mucous membranes dry.   NECK: Supple, without adenopathy. No carotid bruit.   CHEST: Clear. No rales. No rhonchi.   HEART: Rate and rhythm regular. S1 and S2. No S3 or S4. No murmurs appreciable.   ABDOMEN: Soft. Bowel sounds positive.   BACK AND SPINE: Normal.  EXTREMITIES:  Without clubbing, cyanosis or edema.   NEUROLOGIC: Cranial nerves II-XII appear grossly intact. She moves all 4 extremities.  There is no focal weakness noted. She is not able to stand up by herself in spite of with assistance at bedside.  She has numerous healed bruises on her legs and arms noted.     DIAGNOSTIC DATA: Blood sugar is 117. The BUN is 18, creatinine 1.1. GFR is 45. Otherwise CMP was normal. Her albumin is slightly low 3.4. CBC was normal. Urinalysis showed trace amount of blood, 0 RBCs, 3-5 WBCs on a catheter specimen.     ASSESSMENT:   1.  Mental status changes, probably secondary to progressive dementia versus a urinary tract infection.   2.  Hypertension.   3.  Hyperlipidemia.   4.  Multi-joint osteoarthritis.   5.  Chronic kidney disease, stage 3.   6.  History of knee replacement.    PLAN:  At this time, the patient will be admitted, has been started on antibiotics. Get PT and OT to see the patient in consultation.   will see the patient in consultation and discuss with family about possible long-term placement.  Await urine cultures. We will monitor carefully, DVT prophylaxis.       Rozina Martin M.D.*  KK:gm  D:  06/13/2017 07:36:23  T:  06/13/2017 07:56:29   Job ID:  [id number]   Document ID: [id number]  cc:

## 2017-06-13 NOTE — PROGRESS NOTES
"Daily Progress Note:    Subjective: Patient without complaints she can raise presently confusedreport they noted some pain on movement of her left hip x-ray was done and did not show any evidence of any acute fractures.  Family is concerned that she is complaining also pain in the right hip and right leg although asked her she doesn't complain of anything    Flowsheet Rows         First Filed Value    Admission Height  64\" (162.6 cm) Documented at 06/11/2017 1149    Admission Weight  165 lb (74.8 kg) Documented at 06/11/2017 1149          Patient Vitals for the past 24 hrs:   BP Temp Temp src Pulse Resp SpO2   06/13/17 0639 146/76 98.2 °F (36.8 °C) Oral 82 18 98 %   06/12/17 2348 121/67 97.9 °F (36.6 °C) Oral 79 18 97 %   06/12/17 1944 137/75 96.9 °F (36.1 °C) Oral 93 20 94 %   06/12/17 1500 119/65 96.7 °F (35.9 °C) Oral 89 16 95 %   06/12/17 1100 106/76 97.8 °F (36.6 °C) Oral 81 16 95 %   06/12/17 0748 144/74 97.6 °F (36.4 °C) Oral - 16 -         Intake/Output Summary (Last 24 hours) at 06/13/17 0729  Last data filed at 06/12/17 1817   Gross per 24 hour   Intake              240 ml   Output                0 ml   Net              240 ml       Review of Systems   Unable to perform ROS: Dementia       Physical Exam   Constitutional: She appears well-developed and well-nourished.   HENT:   Head: Normocephalic.   Mouth/Throat: Oropharynx is clear and moist.   Eyes: Conjunctivae are normal.   Neck: Normal range of motion. No JVD present. No thyromegaly present.   Cardiovascular: Normal rate, regular rhythm and normal heart sounds.    No murmur heard.  Pulmonary/Chest: Effort normal and breath sounds normal. No respiratory distress. She has no wheezes. She has no rales.   Abdominal: Soft. Bowel sounds are normal. She exhibits no distension. There is no tenderness. There is no guarding.   Musculoskeletal:   She complains of mild pain on passive range of motion of both hips.  She was available to stand at bedside with " assistance yesterday   Neurological: She is alert. She is disoriented.   Skin: Skin is warm and dry. No rash noted.   Nursing note and vitals reviewed.          Medication Review:   I have reviewed the patient's current medication list      amLODIPine 2.5 mg Oral Daily   cefuroxime 250 mg Oral Q12H   cholecalciferol 4,000 Units Oral Daily   enoxaparin 30 mg Subcutaneous Daily   gabapentin 300 mg Oral Daily   OLANZapine 2.5 mg Oral Nightly   pantoprazole 40 mg Oral QAM           Labs:    Results from last 7 days  Lab Units 06/13/17  0701 06/11/17  1158   WBC 10*3/mm3 4.93 6.00   HEMOGLOBIN g/dL 13.2 13.8   HEMATOCRIT % 38.8 40.5   PLATELETS 10*3/mm3 254 246       Results from last 7 days  Lab Units 06/11/17  1158   SODIUM mmol/L 139   POTASSIUM mmol/L 3.5   CHLORIDE mmol/L 99   TOTAL CO2 mmol/L 21.3*   BUN mg/dL 18   CREATININE mg/dL 1.14*   CALCIUM mg/dL 8.9   BILIRUBIN mg/dL 1.7*   ALK PHOS U/L 170*   ALT (SGPT) U/L 8   AST (SGOT) U/L 17   GLUCOSE mg/dL 117*           Lab Results (last 24 hours)     Procedure Component Value Units Date/Time    Urine Culture [[id number]]  (Normal) Collected:  06/11/17 1856    Specimen:  Urine from Urine, Catheter Updated:  06/12/17 0805     Urine Culture No growth    Basic Metabolic Panel [[id number]] Collected:  06/13/17 0701    Specimen:  Blood Updated:  06/13/17 0708    CBC (No Diff) [[id number]]  (Normal) Collected:  06/13/17 0701    Specimen:  Blood Updated:  06/13/17 0714     WBC 4.93 10*3/mm3      RBC 4.38 10*6/mm3      Hemoglobin 13.2 g/dL      Hematocrit 38.8 %      MCV 88.6 fL      MCH 30.1 pg      MCHC 34.0 g/dL      RDW 13.4 %      RDW-SD 43.9 fl      MPV 9.2 fL      Platelets 254 10*3/mm3               Radiology:  Imaging Results (last 24 hours)     Procedure Component Value Units Date/Time    XR Hip With or Without Pelvis 1 View Left [[id number]] Collected:  06/12/17 1521     Updated:  06/12/17 1526    Narrative:       AP PORTABLE LEFT HIP, 06/12/2017:      HISTORY:  87-year-old female hospital inpatient with confusion/mental status  changes. Possible fall. Unreliable historian. Left hip pain.     TECHNIQUE:  A single AP radiograph of the left hip was obtained portably at the  bedside.     FINDINGS:  There is no gross evidence of fracture, dislocation or other acute  osseous abnormality. Mild degenerative arthropathy is noted. No change  since 04/28/2017.       Impression:       No acute osseous abnormality. Mild degenerative arthropathy.     This report was finalized on 6/12/2017 3:24 PM by Dr. Eric Pruett MD.             Cardiology:  ECG/EMG Results (last 24 hours)     Procedure Component Value Units Date/Time    ECG 12 Lead [[id number]] Collected:  06/11/17 1202     Updated:  06/12/17 1224    Narrative:       RR Interval= 652 ms  AK Interval= 148 ms  QRSD Interval= 116 ms  QT Interval= 376 ms  QTc Interval= 466 ms  Heart Rate= 92 ms  P Axis= 19 deg  QRS Axis= -32 deg  T Wave Axis= -24 deg  I: 40 Axis= 12 deg  T: 40 Axis= 139 deg  ST Axis= -72 deg  SINUS RHYTHM  INCOMPLETE RIGHT BUNDLE BRANCH BLOCK  NO SIGNIFICANT CHANGE FROM PREVIOUS ECG  Electronically Signed by:  Saúl Styles ([facility]) 12-Jun-2017 12:22:20  Date and Time of Study: 2017-06-11 12:02:46          I have reviewed consult notes.    Assessment and Plan:    1.  Mental status changes probably progression of dementia versus underlying UTI.  Await urine cultures    2.  Hypertension is well controlled on medicines we continued    3.  Multi- Joint osteoarthritis will check x-rays of her right hip and right knee    4.  Reflux disease continue home PPI    5.  Chronic kidney disease stage III will monitor    6.  Had a long discussion with the patient's grandson yesterday and the patient's spouse this morning he wishes to take her home does not want her in long-term placement.  He has engaged the services of somebody to stay with her during the day

## 2017-06-13 NOTE — NURSING NOTE
was here and stated he would be back tomorrow Wednesday 06/14 around 6:00 PM to get his wife. He was making arrangements to have someone at his house to stay with wife and they can not be there until 6:00 PM.

## 2017-06-13 NOTE — PLAN OF CARE
Problem: Patient Care Overview (Adult)  Goal: Plan of Care Review    06/13/17 1033   Outcome Evaluation   Outcome Summary/Follow up Plan PT: Patient required min A for bed mobility, CGA for sit to stand transfers, and varying levels of assist from min A to max A with bed to chair transfer. Patient required increased assist secondary to decreased safety awareness and attempt to sit in chair prior to arrival. Patient requires step by step sequencing for all functional tasks/mobility secondary to poor cognition. Patient demonstrates no carry over from session to session or within session. Continue to recommend 24 hour care/assist or extended nursing care if this level of care is not able to provided at home.

## 2017-06-13 NOTE — SIGNIFICANT NOTE
06/13/17 0835   Rehab Treatment   Discipline occupational therapist   Treatment Not Performed patient unavailable for treatment  (pt eating breakfast.  will follow up later in am)

## 2017-06-13 NOTE — PLAN OF CARE
Problem: Patient Care Overview (Adult)  Goal: Plan of Care Review  Outcome: Ongoing (interventions implemented as appropriate)    06/11/17 2132 06/12/17 2224   Coping/Psychosocial Response Interventions   Plan Of Care Reviewed With --  patient   Patient Care Overview   Progress progress towards functional goals is fair --        Goal: Adult Individualization and Mutuality  Outcome: Ongoing (interventions implemented as appropriate)  Goal: Discharge Needs Assessment  Outcome: Ongoing (interventions implemented as appropriate)    Problem: Fall Risk (Adult)  Goal: Identify Related Risk Factors and Signs and Symptoms  Outcome: Ongoing (interventions implemented as appropriate)  Goal: Absence of Falls  Outcome: Ongoing (interventions implemented as appropriate)    Problem: Pain, Acute (Adult)  Goal: Identify Related Risk Factors and Signs and Symptoms  Outcome: Ongoing (interventions implemented as appropriate)  Goal: Acceptable Pain Control/Comfort Level  Outcome: Ongoing (interventions implemented as appropriate)

## 2017-06-13 NOTE — PLAN OF CARE
Problem: Patient Care Overview (Adult)  Goal: Plan of Care Review    06/13/17 1030   Outcome Evaluation   Outcome Summary/Follow up Plan OT: Pt required min assist for bed mobility, min/mod assist for functional mobilty (short distance from her bed to the recliner) and was dependent for hygiene/clothing managment affter incontinence of bowel. Pt requires simple, one step directions to intiate and continue with tasks due to her confusion. Rec 24 hour care.

## 2017-06-13 NOTE — THERAPY TREATMENT NOTE
Acute Care - Occupational Therapy Treatment Note  MARIO Garces     Patient Name: Fernanda Spivey  : 1929  MRN: [id number]  Today's Date: 2017  Onset of Illness/Injury or Date of Surgery Date: 17  Date of Referral to OT: 17  Referring Physician: Mario      Admit Date: 2017    Visit Dx:     ICD-10-CM ICD-9-CM   1. Acute UTI N39.0 599.0   2. Altered mental status, unspecified R41.82 780.97   3. Weakness R53.1 780.79   4. Renal insufficiency N28.9 593.9     Patient Active Problem List   Diagnosis   • Acute UTI   • Generalized weakness             Adult Rehabilitation Note       17 1000           Discipline occupational therapist  -SD    Document Type therapy note (daily note)  -SD    Subjective Information agree to therapy;no complaints  -SD    Patient Effort, Rehab Treatment adequate  -SD    Symptoms Noted During/After Treatment     Precautions/Limitations fall precautions   confusion  -SD    Recorded by [SD] Ace Stinson OTR       Pain Assessment No/denies pain  -SD    Recorded by [SD] Ace Stinson OTR       Current Cognitive/Communication Assessment impaired  -SD    Orientation Status oriented to;person  -SD    Follows Commands/Answers Questions     Personal Safety decreased insight to deficits;decreased awareness, need for safety;decreased awareness, need for assist  -SD    Personal Safety Interventions gait belt;fall prevention program maintained;nonskid shoes/slippers when out of bed;supervised activity  -SD    Recorded by [SD] Ace Stinson OTR       Bed Mobility, Assistive Device bed rails;head of bed elevated  -SD    Bed Mob, Supine to Sit, [place] minimum assist (75% patient effort);verbal cues required  -SD    Bed Mobility, Comment     Recorded by [SD] Ace Stinson OTR       Transfers, Bed-Chair [place]     Transfers, Bed-Chair-Bed, Assist Device     Transfers, Sit-Stand [place] contact guard assist;verbal cues required  -SD    Transfers,  Stand-Sit [place] moderate assist (50% patient effort);maximum assist (25% patient effort);verbal cues required  -SD    Transfers, Sit-Stand-Sit, Assist Device rolling walker  -SD    Transfer, Comment Pt attempted to sit in recliner before she was directly in front of the chair.  Verbal/tactle cues and physical assistance was required to safely guide/lower her into the recliner.  -SD    Recorded by [SD] Ace Stinson OTR       Gait, Comment     Recorded by        Functional Mobility- Ind. Level minimum assist (75% patient effort);moderate assist (50% patient effort)  -SD    Functional Mobility- Device rolling walker  -SD    Functional Mobility-Distance (Feet) 6  -SD    Functional Mobility- Safety Issues balance decreased during turns;sequencing ability decreased;step length decreased;weight-shifting ability decreased  -SD    Functional Mobility- Comment Pt required verbal cues to intiate activity and for sequencing during mobility.    -SD    Recorded by [SD] Ace Stinson OTR       Toileting Assess/Train, Indepen Level dependent (less than 25% patient effort)  -SD    Toileting Assess/Train, Comment pt was incontinent.  She was dependent for hygiene and management of incontinent brief.    -SD    Recorded by [SD] DIMITRIS Bueno       Pre-Treatment Position in bed  -SD    Post Treatment Position chair  -SD    In Chair reclined;sitting;encouraged to call for assist;exit alarm on;call light within reach  -SD    Recorded by [SD] DIMITRIS Bueno      User Key  (r) = Recorded By, (t) = Taken By, (c) = Cosigned By    Initials Name Effective Dates    SD Ace Stinson OTR 06/22/16 -     BP Lew Sewell, PT 12/01/15 -                 OT Goals       06/12/17 1120          Patient Education OT STG    Patient Education OT STG, Date Established 06/12/17  -SD      Patient Education OT STG, Time to Achieve 2 days  -SD      Patient Education OT STG, Education Type home safety  -SD      Patient  Education OT STG, Additional Goal education with caregiver regarding recommendation for 24 hour care and direct supervision with adl's due to her cognitive status  -SD        User Key  (r) = Recorded By, (t) = Taken By, (c) = Cosigned By    Initials Name Provider Type    SD DIMITRIS Bueno Occupational Therapist          Occupational Therapy Education     Title: PT OT SLP Therapies (Active)     Topic: Occupational Therapy (Active)     Point: ADL training (Active)    Description: Instruct learner(s) on proper safety adaptation and remediation techniques during self care or transfers.   Instruct in proper use of assistive devices.    Learning Progress Summary    Learner Readiness Method Response Comment Documented by Status   Patient Acceptance E,TB NR education with bed mobility and functional transfers.  Pt needs direct supervision/ assistance for safety due to physical and cognitive deficits.  Pt needs 24 hour care. SD 06/13/17 1028 Active    Acceptance E NR education regarding OT services and benefits of activity SD 06/12/17 1113 Active                      User Key     Initials Effective Dates Name Provider Type Discipline    SD 06/22/16 -  Ace Stinson, OTR Occupational Therapist OT                  OT Recommendation and Plan  Anticipated Discharge Disposition:  (pt needs 24 hour care)  Planned Therapy Interventions: other (see comments) (caregiver education/home safety)  Therapy Frequency: other (see comments) (will establish after PLOF is determined)  Plan of Care Review  Outcome Summary/Follow up Plan: OT:  Pt required min assist for bed mobility, min/mod assist for functional mobilty (short distance from her bed to the recliner) and was dependent for hygiene/clothing managment affter incontinence of bowel.  Pt requires simple, one step directions to intiate and continue with tasks due to her confusion.  Rec 24 hour care.         Outcome Measures       06/13/17 1000 06/12/17 0950 06/12/17 0941     How much help from another person do you currently need...    Turning from your back to your side while in flat bed without using bedrails?   3  -BP    Moving from lying on back to sitting on the side of a flat bed without bedrails?   3  -BP    Moving to and from a bed to a chair (including a wheelchair)?   1  -BP    Standing up from a chair using your arms (e.g., wheelchair, bedside chair)?   3  -BP    Climbing 3-5 steps with a railing?   1  -BP    To walk in hospital room?   1  -BP    AM-PAC 6 Clicks Score   12  -BP    How much help from another is currently needed...    Putting on and taking off regular lower body clothing? 2  -SD  2  -SD    Bathing (including washing, rinsing, and drying) 2  -SD  2  -SD    Toileting (which includes using toilet bed pan or urinal) 1  -SD  2  -SD    Putting on and taking off regular upper body clothing 3  -SD  3  -SD    Taking care of personal grooming (such as brushing teeth) 3  -SD  3  -SD    Eating meals 3  -SD  3  -SD    Score 14  -SD  15  -SD    Functional Assessment    Outcome Measure Options  AM-PAC 6 Clicks Daily Activity (OT)  -SD AM-PAC 6 Clicks Basic Mobility (PT)  -BP      User Key  (r) = Recorded By, (t) = Taken By, (c) = Cosigned By    Initials Name Provider Type    DIMITRIS Keating Occupational Therapist    BP Lew Sewell, PT Physical Therapist           Time Calculation:         Time Calculation- OT       06/13/17 1034          Time Calculation- OT    OT Start Time 0944  -SD      OT Stop Time 1000  -SD      OT Time Calculation (min) 16 min  -SD        User Key  (r) = Recorded By, (t) = Taken By, (c) = Cosigned By    Initials Name Provider Type    DIMITRIS Keating Occupational Therapist           Therapy Charges for Today     Code Description Service Date Service Provider Modifiers Qty    50280614218  OT EVAL MOD COMPLEXITY 1 6/12/2017 DIMITRIS Bueno 1    62306392621  OT SELFCARE CURRENT 6/12/2017 DIMITRIS Bueno, CK 1     [id number]  OT SELFCARE PROJECTED 6/12/2017 DIMITRIS Bueno, CK 1    [id number]  OT SELF CARE/MGMT/TRAIN EA 15 MIN 6/13/2017 DIMITRIS Bueno 1          OT G-codes  OT Professional Judgement Used?: Yes (pt with functional deficits due to cognitiion and weakness)  OT Functional Scales Options: AM-PAC 6 Clicks Daily Activity (OT)  Score: 15  Functional Limitation: Self care  Self Care Current Status (): At least 40 percent but less than 60 percent impaired, limited or restricted  Self Care Goal Status (): At least 40 percent but less than 60 percent impaired, limited or restricted    DIMITRIS Vance  6/13/2017

## 2017-06-14 VITALS
TEMPERATURE: 97 F | WEIGHT: 174.04 LBS | HEART RATE: 82 BPM | SYSTOLIC BLOOD PRESSURE: 137 MMHG | DIASTOLIC BLOOD PRESSURE: 69 MMHG | RESPIRATION RATE: 18 BRPM | BODY MASS INDEX: 29.71 KG/M2 | HEIGHT: 64 IN | OXYGEN SATURATION: 93 %

## 2017-06-14 PROCEDURE — 96372 THER/PROPH/DIAG INJ SC/IM: CPT

## 2017-06-14 PROCEDURE — G0378 HOSPITAL OBSERVATION PER HR: HCPCS

## 2017-06-14 PROCEDURE — 25010000002 ENOXAPARIN PER 10 MG: Performed by: HOSPITALIST

## 2017-06-14 RX ADMIN — AMLODIPINE BESYLATE 2.5 MG: 2.5 TABLET ORAL at 10:03

## 2017-06-14 RX ADMIN — VITAMIN D, TAB 1000IU (100/BT) 4000 UNITS: 25 TAB at 10:03

## 2017-06-14 RX ADMIN — CEFUROXIME AXETIL 250 MG: 250 TABLET ORAL at 17:34

## 2017-06-14 RX ADMIN — PANTOPRAZOLE SODIUM 40 MG: 40 TABLET, DELAYED RELEASE ORAL at 06:09

## 2017-06-14 RX ADMIN — ENOXAPARIN SODIUM 30 MG: 30 INJECTION SUBCUTANEOUS at 10:04

## 2017-06-14 RX ADMIN — CEFUROXIME AXETIL 250 MG: 250 TABLET ORAL at 06:09

## 2017-06-14 RX ADMIN — GABAPENTIN 300 MG: 300 CAPSULE ORAL at 10:03

## 2017-06-14 NOTE — PLAN OF CARE
Problem: Patient Care Overview (Adult)  Goal: Plan of Care Review  Outcome: Outcome(s) achieved Date Met:  06/14/17 06/14/17 0856   Coping/Psychosocial Response Interventions   Plan Of Care Reviewed With patient;spouse   Patient Care Overview   Progress improving       Goal: Adult Individualization and Mutuality  Outcome: Outcome(s) achieved Date Met:  06/14/17  Goal: Discharge Needs Assessment  Outcome: Outcome(s) achieved Date Met:  06/14/17    Problem: Fall Risk (Adult)  Goal: Identify Related Risk Factors and Signs and Symptoms  Outcome: Outcome(s) achieved Date Met:  06/14/17  Goal: Absence of Falls  Outcome: Outcome(s) achieved Date Met:  06/14/17    Problem: Pain, Acute (Adult)  Goal: Identify Related Risk Factors and Signs and Symptoms  Outcome: Outcome(s) achieved Date Met:  06/14/17  Goal: Acceptable Pain Control/Comfort Level  Outcome: Outcome(s) achieved Date Met:  06/14/17

## 2017-06-14 NOTE — NURSING NOTE
Continued Stay Note  MARIO Garces     Patient Name: Fernanda Spivey  MRN: [id number]  Today's Date: 6/14/2017    Admit Date: 6/11/2017          Discharge Plan       06/14/17 0845    Case Management/Social Work Plan    Plan Home today with [facility] at home.    Additional Comments Referral given to [facility] with [facility] at home. Will  continue to follow.                Discharge Codes     None        Expected Discharge Date and Time     Expected Discharge Date Expected Discharge Time    Jun 14, 2017             Yeny Cochran RN

## 2017-06-14 NOTE — THERAPY DISCHARGE NOTE
Acute Care - Physical Therapy Discharge Summary   Chelsea Diallo       Patient Name: Fernanda Spivey  : 1929  MRN: [id number]    Today's Date: 2017  Onset of Illness/Injury or Date of Surgery Date: 17    Date of Referral to PT: 17  Referring Physician: Mario      Admit Date: 2017      PT Recommendation and Plan    Visit Dx:    ICD-10-CM ICD-9-CM   1. Acute UTI N39.0 599.0   2. Altered mental status, unspecified R41.82 780.97   3. Weakness R53.1 780.79   4. Renal insufficiency N28.9 593.9   5. Generalized weakness R53.1 780.79             Outcome Measures       17 1000 17 0945 17 0950    How much help from another person do you currently need...    Turning from your back to your side while in flat bed without using bedrails?  3  -BP     Moving from lying on back to sitting on the side of a flat bed without bedrails?  3  -BP     Moving to and from a bed to a chair (including a wheelchair)?  2  -BP     Standing up from a chair using your arms (e.g., wheelchair, bedside chair)?  3  -BP     Climbing 3-5 steps with a railing?  2  -BP     To walk in hospital room?  2  -BP     AM-PAC 6 Clicks Score  15  -BP     How much help from another is currently needed...    Putting on and taking off regular lower body clothing? 2  -SD      Bathing (including washing, rinsing, and drying) 2  -SD      Toileting (which includes using toilet bed pan or urinal) 1  -SD      Putting on and taking off regular upper body clothing 3  -SD      Taking care of personal grooming (such as brushing teeth) 3  -SD      Eating meals 3  -SD      Score 14  -SD      Functional Assessment    Outcome Measure Options  AM-PAC 6 Clicks Basic Mobility (PT)  -BP AM-PAC 6 Clicks Daily Activity (OT)  -SD      17 0941          How much help from another person do you currently need...    Turning from your back to your side while in flat bed without using bedrails? 3  -BP      Moving from lying on back to sitting  on the side of a flat bed without bedrails? 3  -BP      Moving to and from a bed to a chair (including a wheelchair)? 1  -BP      Standing up from a chair using your arms (e.g., wheelchair, bedside chair)? 3  -BP      Climbing 3-5 steps with a railing? 1  -BP      To walk in hospital room? 1  -BP      AM-PAC 6 Clicks Score 12  -BP      How much help from another is currently needed...    Putting on and taking off regular lower body clothing? 2  -SD      Bathing (including washing, rinsing, and drying) 2  -SD      Toileting (which includes using toilet bed pan or urinal) 2  -SD      Putting on and taking off regular upper body clothing 3  -SD      Taking care of personal grooming (such as brushing teeth) 3  -SD      Eating meals 3  -SD      Score 15  -SD      Functional Assessment    Outcome Measure Options AM-PAC 6 Clicks Basic Mobility (PT)  -BP        User Key  (r) = Recorded By, (t) = Taken By, (c) = Cosigned By    Initials Name Provider Type    KEITH Stinson, OTR Occupational Therapist    BP Lew Sewell, PT Physical Therapist                      IP PT Goals       06/14/17 1113 06/12/17 1025       Bed Mobility PT STG    Bed Mobility PT STG, Date Established  06/12/17  -BP     Bed Mobility PT STG, Time to Achieve  3 days  -BP     Bed Mobility PT STG, Activity Type  supine to sit/sit to supine  -BP     Bed Mobility PT STG, [place] Level  supervision required;verbal cues required  -BP     Bed Mobility PT STG, Date Goal Reviewed 06/14/17  -BP      Bed Mobility PT STG, Outcome goal not met  -BP      Bed Mobility PT STG, Reason Goal Not Met discharged from facility  -BP      Transfer Training PT STG    Transfer Training PT STG, Date Established  06/12/17  -BP     Transfer Training PT STG, Time to Achieve  3 days  -BP     Transfer Training PT STG, Activity Type  sit to stand/stand to sit  -BP     Transfer Training PT STG, [place] Level  supervision required;verbal cues required  -BP     Transfer  Training PT STG, Date Goal Reviewed 06/14/17  -BP      Transfer Training PT STG, Outcome goal not met  -BP      Transfer Training PT STG, Reason Goal Not Met discharged from facility  -BP      Gait Training PT STG    Gait Training Goal PT STG, Date Established  06/12/17  -BP     Gait Training Goal PT STG, Time to Achieve  3 days  -BP     Gait Training Goal PT STG, [place] Level  contact guard assist;verbal cues required  -BP     Gait Training Goal PT STG, Assist Device  walker, rolling  -BP     Gait Training Goal PT STG, Distance to Achieve  50  -BP     Gait Training Goal PT STG, Date Goal Reviewed 06/14/17  -BP      Gait Training Goal PT STG, Outcome goal not met  -BP      Gait Training Goal PT STG, Reason Goal Not Met discharged from facility  -BP        User Key  (r) = Recorded By, (t) = Taken By, (c) = Cosigned By    Initials Name Provider Type    BP Lew Sewell, PT Physical Therapist          Therapy Charges for Today     Code Description Service Date Service Provider Modifiers Qty    18553514927 HC PT THER PROC EA 15 MIN 6/13/2017 Lew Sewell, PT GP 1          PT Discharge Summary  Anticipated Discharge Disposition: home with 24/7 care, home with home health  Reason for Discharge: Discharge from facility  Outcomes Achieved:  (Progressed with therapy however did not meet any goals. )  Discharge Destination: Home with home health (home with 24 hour care)  Patient seen by PT x 2 sessions with an emphasis on improved functional mobility. Patient performed bed mobility with min A, sit to/from stand transfers with CGA and completed bed to chair transfers with varying amount of assist from min A to max A x 2. Patient requires step by step verbal cues for sequencing for all functional transfers/mobility. Patient decreased safety awareness requiring verbal cues throughout sessions. Patient oriented to name only at each session with baseline dementia. Due to poor cognition recommend 24 hour care/assist at  discharge or extended nursing care. Unable to speak with family during hospital stay however per case management and MD patient to have 24 hour care upon return home. Patient being discharged home today with home health PT and 24/7 care.  Will discharge from PT at this time.     Lew Sewell, PT   6/14/2017

## 2017-06-14 NOTE — DISCHARGE SUMMARY
HISTORY:  This is a 87-year-old white female patient of Nico De Souza MD with history of  hypertension, hyperlipidemia, dementia who was brought in to the emergency room because of increasing confusion. The patients exam was relatively normal except for confusion and dementia. She complains of pain in both hips. Urinalysis was consistent with mild UTI. She was admitted with mental status changes probably secondary to UTI. Urine cultures were done.   She was started on Ceftin.  PT and OT saw the patient in consultation. She did complain of some pain in both her hips. X-rays were done, did not show any acute abnormalities or fracture. She may have had a fall at home but no evidence of any acute injury. The patient does have some arthritic changes noted on the x-ray. Therapy was able to see the patient. She did take a few steps and was able to get in the chair.      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  He frequently checks on her but the patient is left alone several hours during the day. Social Service saw the patient in consultation. PT and OT felt that the patient required 24 hour care.  We suggested to the  that she be placed in long term care. He did not wish to do that.  He has arranged for someone to come and sit with the patient at home. Urine cultures have come back with Corynebacterium, less than 50,000 colonies, and did not require any treatment. Her worsened mental status is probably secondary to progressive dementia and this was discussed with the son and the .     DISPOSITION:  She was discharged home in satisfactory condition on the same medicines she came in on. She is sent home on home skilled nursing, PT and OT.       Rozina Martin M.D.*  [place]:mg  D:  06/14/2017 07:46:05  T:  06/14/2017 08:40:46   Job ID:  [id number]   Document ID: [id number]  cc:

## 2017-06-14 NOTE — PROGRESS NOTES
"Daily Progress Note:    Subjective: Patient without complaints.  Blood pressure and take a few steps with therapies yesterday.    Flowsheet Rows         First Filed Value    Admission Height  64\" (162.6 cm) Documented at 06/11/2017 1149    Admission Weight  165 lb (74.8 kg) Documented at 06/11/2017 1149          Patient Vitals for the past 24 hrs:   BP Temp Temp src Pulse Resp SpO2   06/14/17 0615 149/79 97.2 °F (36.2 °C) Oral 83 18 95 %   06/14/17 0300 130/62 97.6 °F (36.4 °C) Oral 82 18 94 %   06/13/17 2329 144/83 97.8 °F (36.6 °C) Oral 80 19 96 %   06/13/17 2020 132/67 97.4 °F (36.3 °C) Oral 83 20 93 %   06/13/17 1500 141/87 - - 100 20 -   06/13/17 1100 149/87 97 °F (36.1 °C) Oral 112 22 98 %         Intake/Output Summary (Last 24 hours) at 06/14/17 0730  Last data filed at 06/13/17 1351   Gross per 24 hour   Intake              240 ml   Output                0 ml   Net              240 ml       Review of Systems   Unable to perform ROS: Dementia       Physical Exam   Constitutional: She appears well-developed and well-nourished.   HENT:   Head: Normocephalic.   Mouth/Throat: Oropharynx is clear and moist.   Eyes: Conjunctivae are normal.   Neck: Normal range of motion. No JVD present. No thyromegaly present.   Cardiovascular: Normal rate, regular rhythm and normal heart sounds.    No murmur heard.  Pulmonary/Chest: Effort normal and breath sounds normal. No respiratory distress. She has no wheezes. She has no rales.   Abdominal: Soft. Bowel sounds are normal. She exhibits no distension. There is no tenderness. There is no guarding.   Neurological: She is alert. She is disoriented.   Skin: Skin is warm and dry. No rash noted.   Nursing note and vitals reviewed.          Medication Review:   I have reviewed the patient's current medication list      amLODIPine 2.5 mg Oral Daily   cefuroxime 250 mg Oral Q12H   cholecalciferol 4,000 Units Oral Daily   enoxaparin 30 mg Subcutaneous Daily   gabapentin 300 mg Oral " Daily   OLANZapine 2.5 mg Oral Nightly   pantoprazole 40 mg Oral QAM           Labs:    Results from last 7 days  Lab Units 06/13/17  0701 06/11/17  1158   WBC 10*3/mm3 4.93 6.00   HEMOGLOBIN g/dL 13.2 13.8   HEMATOCRIT % 38.8 40.5   PLATELETS 10*3/mm3 254 246       Results from last 7 days  Lab Units 06/13/17  0701 06/11/17  1158   SODIUM mmol/L 136 139   POTASSIUM mmol/L 3.5 3.5   CHLORIDE mmol/L 98 99   TOTAL CO2 mmol/L 23.7 21.3*   BUN mg/dL 13 18   CREATININE mg/dL 0.89 1.14*   CALCIUM mg/dL 8.5* 8.9   BILIRUBIN mg/dL  --  1.7*   ALK PHOS U/L  --  170*   ALT (SGPT) U/L  --  8   AST (SGOT) U/L  --  17   GLUCOSE mg/dL 136* 117*           Lab Results (last 24 hours)     Procedure Component Value Units Date/Time    Basic Metabolic Panel [[id number]]  (Abnormal) Collected:  06/13/17 0701    Specimen:  Blood Updated:  06/13/17 0741     Glucose 136 (H) mg/dL      BUN 13 mg/dL      Creatinine 0.89 mg/dL      Sodium 136 mmol/L      Potassium 3.5 mmol/L      Chloride 98 mmol/L      CO2 23.7 mmol/L      Calcium 8.5 (L) mg/dL      eGFR Non African Amer 60 (L) mL/min/1.73      BUN/Creatinine Ratio 14.6     Anion Gap 14.3 mmol/L     Narrative:       The MDRD GFR formula is only valid for adults with stable renal function between ages 18 and 70.    Urine Culture [[id number]]  (Abnormal) Collected:  06/11/17 1856    Specimen:  Urine from Urine, Catheter Updated:  06/13/17 1010     Urine Culture 50,000 CFU/mL Corynebacterium species (A)        No definitive guidelines. All are susceptible to vancomycin. Resistance to penicillins & cephalosporins does occur.                 Radiology:  Imaging Results (last 24 hours)     Procedure Component Value Units Date/Time    XR Knee 1 or 2 View Right [[id number]] Collected:  06/13/17 0825     Updated:  06/13/17 0828    Narrative:       RIGHT KNEE, 06/13/2017:     HISTORY:   87-year-old female hospital inpatient with mental status changes.  Possible fall. Unreliable historian. Hip and knee  pain.     TECHNIQUE:  AP and cross table lateral radiographs of the right hip.     FINDINGS:  Total knee arthroplasty components appear well-positioned. No visible  fracture, dislocation or additional osseous abnormality.       Impression:       1. No acute osseous abnormality.  2. Well-positioned total knee arthroplasty.     This report was finalized on 6/13/2017 8:26 AM by Dr. Eric Pruett MD.       XR Hip With or Without Pelvis 1 View Right [[id number]] Collected:  06/13/17 0826     Updated:  06/13/17 0830    Narrative:       RIGHT HIP, 06/13/2017:     HISTORY:  87-year-old female hospital inpatient with mental status changes.  Possible fall. Unreliable historian. Hip and knee pain.     TECHNIQUE:  A single limited AP radiograph of the right hip was obtained portably at  the bedside.     FINDINGS:  There is no gross evidence of fracture, dislocation or other acute  osseous abnormality. Mild degenerative arthropathy.       Impression:       Limited study showing no acute osseous abnormality. Mild degenerative  arthropathy.     This report was finalized on 6/13/2017 8:28 AM by Dr. Eric Pruett MD.             Cardiology:  ECG/EMG Results (last 24 hours)     Procedure Component Value Units Date/Time    ECG 12 Lead [[id number]] Collected:  06/11/17 1202     Updated:  06/12/17 1224    Narrative:       RR Interval= 652 ms  DE Interval= 148 ms  QRSD Interval= 116 ms  QT Interval= 376 ms  QTc Interval= 466 ms  Heart Rate= 92 ms  P Axis= 19 deg  QRS Axis= -32 deg  T Wave Axis= -24 deg  I: 40 Axis= 12 deg  T: 40 Axis= 139 deg  ST Axis= -72 deg  SINUS RHYTHM  INCOMPLETE RIGHT BUNDLE BRANCH BLOCK  NO SIGNIFICANT CHANGE FROM PREVIOUS ECG  Electronically Signed by:  Saúl Styles ([facility]) 12-Jun-2017 12:22:20  Date and Time of Study: 2017-06-11 12:02:46          I have reviewed consult notes.    Assessment and Plan:    1.  Mental status changes probably progression of dementia. Urine culture shows Corynebacterium 50,000  colonies no need for treatment    2.  Hypertension is well controlled on medicines we continued    3.  Multi- Joint osteoarthritis will check x-rays of her right hip and right knee    4.  Reflux disease continue home PPI    5.  Chronic kidney disease stage III will monitor    6.  Had a long discussion with the patient's grandson yesterday and the patient's spouse this morning he wishes to take her home does not want her in long-term placement.  He has engaged the services of somebody to stay with her during the day.  Discharge home today with home health and physical therapy

## 2017-06-14 NOTE — PLAN OF CARE
Problem: Inpatient Physical Therapy  Goal: Bed Mobility Goal STG- PT  Outcome: Unable to achieve outcome(s) by discharge Date Met:  06/14/17 06/12/17 1025 06/14/17 1113   Bed Mobility PT STG   Bed Mobility PT STG, Date Established 06/12/17 --    Bed Mobility PT STG, Time to Achieve 3 days --    Bed Mobility PT STG, Activity Type supine to sit/sit to supine --    Bed Mobility PT STG, [place] Level supervision required;verbal cues required --    Bed Mobility PT STG, Date Goal Reviewed --  06/14/17   Bed Mobility PT STG, Outcome --  goal not met   Bed Mobility PT STG, Reason Goal Not Met --  discharged from facility       Goal: Transfer Training Goal 1 STG- PT  Outcome: Unable to achieve outcome(s) by discharge Date Met:  06/14/17 06/12/17 1025 06/14/17 1113   Transfer Training PT STG   Transfer Training PT STG, Date Established 06/12/17 --    Transfer Training PT STG, Time to Achieve 3 days --    Transfer Training PT STG, Activity Type sit to stand/stand to sit --    Transfer Training PT STG, [place] Level supervision required;verbal cues required --    Transfer Training PT STG, Date Goal Reviewed --  06/14/17   Transfer Training PT STG, Outcome --  goal not met   Transfer Training PT STG, Reason Goal Not Met --  discharged from facility       Goal: Gait Training Goal STG- PT  Outcome: Unable to achieve outcome(s) by discharge Date Met:  06/14/17 06/12/17 1025 06/14/17 1113   Gait Training PT STG   Gait Training Goal PT STG, Date Established 06/12/17 --    Gait Training Goal PT STG, Time to Achieve 3 days --    Gait Training Goal PT STG, [place] Level contact guard assist;verbal cues required --    Gait Training Goal PT STG, Assist Device walker, rolling --    Gait Training Goal PT STG, Distance to Achieve 50 --    Gait Training Goal PT STG, Date Goal Reviewed --  06/14/17   Gait Training Goal PT STG, Outcome --  goal not met   Gait Training Goal PT STG, Reason Goal Not Met --  discharged from  facility